# Patient Record
Sex: FEMALE | Race: WHITE | Employment: OTHER | URBAN - METROPOLITAN AREA
[De-identification: names, ages, dates, MRNs, and addresses within clinical notes are randomized per-mention and may not be internally consistent; named-entity substitution may affect disease eponyms.]

---

## 2017-01-25 ENCOUNTER — OFFICE VISIT (OUTPATIENT)
Dept: FAMILY MEDICINE CLINIC | Facility: CLINIC | Age: 72
End: 2017-01-25

## 2017-01-25 VITALS
BODY MASS INDEX: 33.13 KG/M2 | TEMPERATURE: 98 F | DIASTOLIC BLOOD PRESSURE: 80 MMHG | HEIGHT: 62 IN | SYSTOLIC BLOOD PRESSURE: 122 MMHG | HEART RATE: 80 BPM | OXYGEN SATURATION: 97 % | RESPIRATION RATE: 20 BRPM | WEIGHT: 180 LBS

## 2017-01-25 DIAGNOSIS — J01.10 ACUTE FRONTAL SINUSITIS, RECURRENCE NOT SPECIFIED: Primary | ICD-10-CM

## 2017-01-25 PROCEDURE — 99202 OFFICE O/P NEW SF 15 MIN: CPT | Performed by: NURSE PRACTITIONER

## 2017-01-25 RX ORDER — LEVOTHYROXINE SODIUM 88 UG/1
88 TABLET ORAL
COMMUNITY
End: 2017-02-18

## 2017-01-25 RX ORDER — FLUTICASONE PROPIONATE 50 MCG
2 SPRAY, SUSPENSION (ML) NASAL DAILY
Qty: 1 BOTTLE | Refills: 0 | Status: SHIPPED | OUTPATIENT
Start: 2017-01-25 | End: 2017-02-08

## 2017-01-25 RX ORDER — ENALAPRIL MALEATE 10 MG/1
10 TABLET ORAL DAILY
COMMUNITY

## 2017-01-25 RX ORDER — AMOXICILLIN AND CLAVULANATE POTASSIUM 875; 125 MG/1; MG/1
1 TABLET, FILM COATED ORAL 2 TIMES DAILY
Qty: 20 TABLET | Refills: 0 | Status: SHIPPED | OUTPATIENT
Start: 2017-01-25 | End: 2017-02-04

## 2017-01-25 NOTE — PROGRESS NOTES
CHIEF COMPLAINT:   Patient presents with:  Sinus Problem: s/s for 1 week. Post Nasal Drip: sore throat improving, lost of voice  Ear Pain: Left ear      HPI:   Jenny Reese is a 80year old female who presents for sinus congestion for  1  weeks.  Sym SKIN: no rashes,no suspicious lesions  HEAD: atraumatic, normocephalic,  + tenderness on palpation of frontal sinuses  EYES: conjunctiva clear, EOM intact  EARS: TM's clear gray, + bulging, no retraction, + fluid, bony landmarks visualized.    NOSE: nostril -   If increased coughing at night consider elevating head with pillows, or try tea with honey    -   Cepacol lozenges to sooth throat or cough drops      -   Humidified air, saline spray, or Neti pot     -   Take antibiotics with food.    -   Complete enti The sinuses are air-filled spaces within the bones of the face. They connect to the inside of the nose. Sinusitis is an inflammation of the tissue lining the sinus cavity. Sinus inflammation can occur during a cold.  It can also be due to allergies to polle · Do not use nasal rinses or irrigation during an acute sinus infection, unless told to by your health care provider. Rinsing may spread the infection to other sinuses.   · Use acetaminophen or ibuprofen to control pain, unless another pain medicine was pre

## 2017-02-18 ENCOUNTER — OFFICE VISIT (OUTPATIENT)
Dept: FAMILY MEDICINE CLINIC | Facility: CLINIC | Age: 72
End: 2017-02-18

## 2017-02-18 VITALS
SYSTOLIC BLOOD PRESSURE: 124 MMHG | RESPIRATION RATE: 20 BRPM | HEIGHT: 63 IN | OXYGEN SATURATION: 97 % | TEMPERATURE: 98 F | WEIGHT: 180 LBS | DIASTOLIC BLOOD PRESSURE: 80 MMHG | HEART RATE: 74 BPM | BODY MASS INDEX: 31.89 KG/M2

## 2017-02-18 DIAGNOSIS — H02.89 EYELID PAIN, RIGHT: ICD-10-CM

## 2017-02-18 DIAGNOSIS — H10.31 ACUTE CONJUNCTIVITIS OF RIGHT EYE, UNSPECIFIED ACUTE CONJUNCTIVITIS TYPE: Primary | ICD-10-CM

## 2017-02-18 PROCEDURE — 99213 OFFICE O/P EST LOW 20 MIN: CPT | Performed by: NURSE PRACTITIONER

## 2017-02-18 RX ORDER — POLYMYXIN B SULFATE AND TRIMETHOPRIM 1; 10000 MG/ML; [USP'U]/ML
2 SOLUTION OPHTHALMIC EVERY 6 HOURS
Qty: 1 BOTTLE | Refills: 0 | Status: SHIPPED | OUTPATIENT
Start: 2017-02-18 | End: 2017-02-25

## 2017-02-18 RX ORDER — LEVOTHYROXINE SODIUM 88 UG/1
88 TABLET ORAL
COMMUNITY

## 2017-02-18 RX ORDER — CEPHALEXIN 500 MG/1
500 CAPSULE ORAL 2 TIMES DAILY
Qty: 14 CAPSULE | Refills: 0 | Status: SHIPPED | OUTPATIENT
Start: 2017-02-18 | End: 2017-02-25

## 2017-02-18 NOTE — PROGRESS NOTES
CHIEF COMPLAINT:   Patient presents with:  Redness: painful, drainage, right eye  Eyelid Swelling: upper eyelid since AM      HPI:   Anamika Delatorre is a 80year old female who presents with chief complaint of  Right eye irritation.  Symptoms began  2  days mildly erythematous, no visible discharge. No periorbital edema. Upper right lid has moderate edema, erythema and tenderness  HENT: atraumatic, normocephalic, ears and throat are clear  LUNGS: clear to auscultation bilaterally.    CARDIO: RRR without murm

## 2017-02-18 NOTE — PATIENT INSTRUCTIONS
Conjunctivitis Caused by Infection     Wash hands often to help prevent spreading infection. Infections are caused by viruses or germs (bacteria). Treatment includes keeping your eyes and hands clean.  Your healthcare provider may prescribe eye drops,

## 2018-02-19 ENCOUNTER — HOSPITAL ENCOUNTER (EMERGENCY)
Age: 73
Discharge: HOME | End: 2018-02-19
Attending: EMERGENCY MEDICINE
Payer: MEDICARE

## 2018-02-19 ENCOUNTER — APPOINTMENT (OUTPATIENT)
Dept: CT IMAGING | Age: 73
End: 2018-02-19
Attending: EMERGENCY MEDICINE
Payer: MEDICARE

## 2018-02-19 VITALS
DIASTOLIC BLOOD PRESSURE: 83 MMHG | HEIGHT: 63 IN | BODY MASS INDEX: 31.89 KG/M2 | WEIGHT: 180 LBS | RESPIRATION RATE: 14 BRPM | HEART RATE: 61 BPM | SYSTOLIC BLOOD PRESSURE: 159 MMHG | TEMPERATURE: 98 F | OXYGEN SATURATION: 93 %

## 2018-02-19 DIAGNOSIS — M54.50 ACUTE LEFT-SIDED LOW BACK PAIN WITHOUT SCIATICA: Primary | ICD-10-CM

## 2018-02-19 LAB
BILIRUB UR QL STRIP.AUTO: NEGATIVE
CLARITY UR REFRACT.AUTO: CLEAR
COLOR UR AUTO: YELLOW
GLUCOSE UR STRIP.AUTO-MCNC: NEGATIVE MG/DL
KETONES UR STRIP.AUTO-MCNC: NEGATIVE MG/DL
NITRITE UR QL STRIP.AUTO: NEGATIVE
PH UR STRIP.AUTO: 5.5 [PH] (ref 4.5–8)
PROT UR STRIP.AUTO-MCNC: NEGATIVE MG/DL
RBC UR QL AUTO: NEGATIVE
SP GR UR STRIP.AUTO: >=1.03 (ref 1–1.03)
UROBILINOGEN UR STRIP.AUTO-MCNC: 0.2 MG/DL

## 2018-02-19 PROCEDURE — 81015 MICROSCOPIC EXAM OF URINE: CPT | Performed by: EMERGENCY MEDICINE

## 2018-02-19 PROCEDURE — 81001 URINALYSIS AUTO W/SCOPE: CPT | Performed by: EMERGENCY MEDICINE

## 2018-02-19 PROCEDURE — 99284 EMERGENCY DEPT VISIT MOD MDM: CPT

## 2018-02-19 PROCEDURE — 87186 SC STD MICRODIL/AGAR DIL: CPT | Performed by: EMERGENCY MEDICINE

## 2018-02-19 PROCEDURE — 87077 CULTURE AEROBIC IDENTIFY: CPT | Performed by: EMERGENCY MEDICINE

## 2018-02-19 PROCEDURE — 87086 URINE CULTURE/COLONY COUNT: CPT | Performed by: EMERGENCY MEDICINE

## 2018-02-19 PROCEDURE — 74176 CT ABD & PELVIS W/O CONTRAST: CPT | Performed by: EMERGENCY MEDICINE

## 2018-02-19 RX ORDER — HYDROCODONE BITARTRATE AND ACETAMINOPHEN 5; 325 MG/1; MG/1
1 TABLET ORAL ONCE
Status: COMPLETED | OUTPATIENT
Start: 2018-02-19 | End: 2018-02-19

## 2018-02-19 RX ORDER — HYDROCODONE BITARTRATE AND ACETAMINOPHEN 5; 325 MG/1; MG/1
1-2 TABLET ORAL EVERY 4 HOURS PRN
Qty: 20 TABLET | Refills: 0 | Status: SHIPPED | OUTPATIENT
Start: 2018-02-19 | End: 2018-02-26

## 2018-02-19 RX ORDER — DIAZEPAM 5 MG/1
5 TABLET ORAL ONCE
Status: COMPLETED | OUTPATIENT
Start: 2018-02-19 | End: 2018-02-19

## 2018-02-19 RX ORDER — DIAZEPAM 5 MG/1
5 TABLET ORAL 3 TIMES DAILY PRN
Qty: 20 TABLET | Refills: 0 | Status: SHIPPED | OUTPATIENT
Start: 2018-02-19 | End: 2018-02-26

## 2018-02-19 NOTE — ED PROVIDER NOTES
Patient Seen in: [facility] [place]    History   Patient presents with:  Abdomen/Flank Pain (GI/)    Stated Complaint: Left flank pain    HPI    45-year-old female with a history of hypertension, hypothyroidism presents to the [facility] URINALYSIS WITH CULTURE REFLEX - Abnormal; Notable for the following:        Result Value    Leukocyte Esterase Urine Trace (*)     All other components within normal limits   URINE MICROSCOPIC W REFLEX CULTURE - Abnormal; Notable for the following: Clinical Impression:  Acute left-sided low back pain without sciatica  (primary encounter diagnosis)    Disposition:  Discharge  2/19/2018  1:49 am    Follow-up:  Terry Saldaña MD  [address] [place] [id number]  [phone]    Schedule an ap

## 2018-02-21 RX ORDER — CIPROFLOXACIN 500 MG/1
500 TABLET, FILM COATED ORAL 2 TIMES DAILY
Qty: 20 TABLET | Refills: 0 | Status: SHIPPED | OUTPATIENT
Start: 2018-02-21 | End: 2018-03-03

## 2018-02-21 NOTE — ED NOTES
PT NOTIFIED OF NEED FOR ABX DUE TO UTI. PT UPSET THAT IT TOOK SO LONG TO LET HER KNOW. PT STATES SHE HAS DR APPOINTMENT TODAY.   RX CALLED INTO [facility] [phone] PER PT REQUEST

## 2019-05-18 ENCOUNTER — OFFICE VISIT (OUTPATIENT)
Dept: FAMILY MEDICINE CLINIC | Facility: CLINIC | Age: 74
End: 2019-05-18
Payer: MEDICARE

## 2019-05-18 VITALS
HEIGHT: 63 IN | BODY MASS INDEX: 33.66 KG/M2 | RESPIRATION RATE: 17 BRPM | DIASTOLIC BLOOD PRESSURE: 72 MMHG | HEART RATE: 69 BPM | OXYGEN SATURATION: 98 % | WEIGHT: 190 LBS | SYSTOLIC BLOOD PRESSURE: 124 MMHG | TEMPERATURE: 98 F

## 2019-05-18 DIAGNOSIS — L25.9 CONTACT DERMATITIS, UNSPECIFIED CONTACT DERMATITIS TYPE, UNSPECIFIED TRIGGER: Primary | ICD-10-CM

## 2019-05-18 PROCEDURE — 99213 OFFICE O/P EST LOW 20 MIN: CPT | Performed by: PHYSICIAN ASSISTANT

## 2019-05-18 RX ORDER — PREDNISONE 10 MG/1
TABLET ORAL
Qty: 24 TABLET | Refills: 0 | Status: SHIPPED | OUTPATIENT
Start: 2019-05-18 | End: 2021-10-01

## 2019-05-18 NOTE — PROGRESS NOTES
CHIEF COMPLAINT:   Patient presents with:  Rash: was trimming on wed. and noticed rash on chest , abd, and hand . itchy , benadryl          HPI:    Chip Garsia is a 68year old female who presents for evaluation of a rash. Rash started yesterday.  Rash atraumatic, normocephalic. LUNGS: Clear to auscultation bilaterally. No wheezing, rhonchi, or rales. No diminished breath sounds. No increased work of breathing. CARDIO: RRR without murmur  LYMPH: No  lymphadenopathy.      ASSESSMENT AND PLAN:   Randi Weston

## 2019-07-31 ENCOUNTER — OFFICE VISIT (OUTPATIENT)
Dept: FAMILY MEDICINE CLINIC | Facility: CLINIC | Age: 74
End: 2019-07-31
Payer: MEDICARE

## 2019-07-31 VITALS
RESPIRATION RATE: 16 BRPM | SYSTOLIC BLOOD PRESSURE: 136 MMHG | OXYGEN SATURATION: 97 % | TEMPERATURE: 98 F | WEIGHT: 189 LBS | HEIGHT: 63 IN | HEART RATE: 75 BPM | BODY MASS INDEX: 33.49 KG/M2 | DIASTOLIC BLOOD PRESSURE: 80 MMHG

## 2019-07-31 DIAGNOSIS — R05.9 COUGH: ICD-10-CM

## 2019-07-31 DIAGNOSIS — J01.10 ACUTE NON-RECURRENT FRONTAL SINUSITIS: Primary | ICD-10-CM

## 2019-07-31 PROCEDURE — 99213 OFFICE O/P EST LOW 20 MIN: CPT | Performed by: PHYSICIAN ASSISTANT

## 2019-07-31 RX ORDER — CEFDINIR 300 MG/1
300 CAPSULE ORAL 2 TIMES DAILY
Qty: 20 CAPSULE | Refills: 0 | Status: SHIPPED | OUTPATIENT
Start: 2019-07-31 | End: 2019-08-10

## 2019-07-31 RX ORDER — BENZONATATE 200 MG/1
200 CAPSULE ORAL 3 TIMES DAILY PRN
Qty: 30 CAPSULE | Refills: 0 | Status: SHIPPED | OUTPATIENT
Start: 2019-07-31 | End: 2019-08-10

## 2019-07-31 NOTE — PATIENT INSTRUCTIONS
Continue Zyrtec and Flonase   Rest   Fluids   Cough medication as needed   Please follow up with PCP if no improvement or if symptoms worsen

## 2019-07-31 NOTE — PROGRESS NOTES
CHIEF COMPLAINT:   Patient presents with:  Cough: congestion x 8 days, chills, body aches      HPI:   Betty Denson is a 68year old female who presents for cold symptoms for 8 days. +runny nose. +draiange into the throat. +sinus pressure.  +left ear pres normocephalic, + tenderness on palpation of frontal sinuses  EYES: conjunctiva clear  EARS: TM's pearly grey, no bulging, no retraction, no fluid, bony landmarks present  NOSE: nostrils patent, + nasal mucous, nasal mucosa reddened   THROAT: oral mucosa pi

## 2021-03-15 DIAGNOSIS — Z23 NEED FOR VACCINATION: ICD-10-CM

## 2021-06-28 ENCOUNTER — OFFICE VISIT (OUTPATIENT)
Dept: FAMILY MEDICINE CLINIC | Facility: CLINIC | Age: 76
End: 2021-06-28
Payer: MEDICARE

## 2021-06-28 VITALS — HEIGHT: 63 IN | BODY MASS INDEX: 33.66 KG/M2 | WEIGHT: 190 LBS

## 2021-06-28 DIAGNOSIS — Z02.9 ADMINISTRATIVE ENCOUNTER: Primary | ICD-10-CM

## 2021-06-28 NOTE — PROGRESS NOTES
Pt presented with poison ivy after cutting some of her bushes. States she was covered except for her neck which is where the rash is.  + itching X 4 days. Patient wanted to avoid steroids due to them aggravating her GERD.   Discussed possible OTC options

## 2021-08-02 ENCOUNTER — WALK IN (OUTPATIENT)
Dept: URGENT CARE | Age: 76
End: 2021-08-02

## 2021-08-02 VITALS
BODY MASS INDEX: 33.66 KG/M2 | TEMPERATURE: 99.5 F | HEIGHT: 63 IN | SYSTOLIC BLOOD PRESSURE: 130 MMHG | HEART RATE: 68 BPM | DIASTOLIC BLOOD PRESSURE: 70 MMHG | RESPIRATION RATE: 20 BRPM | WEIGHT: 190 LBS

## 2021-08-02 DIAGNOSIS — T24.101A SUPERFICIAL BURN OF RIGHT LOWER EXTREMITY, INITIAL ENCOUNTER: Primary | ICD-10-CM

## 2021-08-02 PROCEDURE — 99202 OFFICE O/P NEW SF 15 MIN: CPT | Performed by: NURSE PRACTITIONER

## 2021-08-02 RX ORDER — LATANOPROST 50 UG/ML
SOLUTION/ DROPS OPHTHALMIC
COMMUNITY
Start: 2021-07-07

## 2021-08-02 RX ORDER — FAMOTIDINE 20 MG/1
20 TABLET, FILM COATED ORAL NIGHTLY
COMMUNITY
Start: 2021-04-29

## 2021-08-02 RX ORDER — ENALAPRIL MALEATE 10 MG/1
10 TABLET ORAL DAILY
COMMUNITY
Start: 2021-07-06

## 2021-08-02 RX ORDER — LEVOTHYROXINE SODIUM 88 MCG
TABLET ORAL
COMMUNITY
Start: 2021-07-06

## 2021-10-01 ENCOUNTER — OFFICE VISIT (OUTPATIENT)
Dept: FAMILY MEDICINE CLINIC | Facility: CLINIC | Age: 76
End: 2021-10-01
Payer: MEDICARE

## 2021-10-01 VITALS
BODY MASS INDEX: 33.66 KG/M2 | DIASTOLIC BLOOD PRESSURE: 74 MMHG | HEART RATE: 78 BPM | WEIGHT: 190 LBS | HEIGHT: 63 IN | RESPIRATION RATE: 18 BRPM | OXYGEN SATURATION: 96 % | SYSTOLIC BLOOD PRESSURE: 138 MMHG | TEMPERATURE: 99 F

## 2021-10-01 DIAGNOSIS — N30.00 ACUTE CYSTITIS WITHOUT HEMATURIA: ICD-10-CM

## 2021-10-01 DIAGNOSIS — R39.9 UTI SYMPTOMS: Primary | ICD-10-CM

## 2021-10-01 PROCEDURE — 81003 URINALYSIS AUTO W/O SCOPE: CPT | Performed by: NURSE PRACTITIONER

## 2021-10-01 PROCEDURE — 99213 OFFICE O/P EST LOW 20 MIN: CPT | Performed by: NURSE PRACTITIONER

## 2021-10-01 PROCEDURE — 87086 URINE CULTURE/COLONY COUNT: CPT | Performed by: NURSE PRACTITIONER

## 2021-10-01 RX ORDER — NITROFURANTOIN 25; 75 MG/1; MG/1
100 CAPSULE ORAL 2 TIMES DAILY
Qty: 10 CAPSULE | Refills: 0 | Status: SHIPPED | OUTPATIENT
Start: 2021-10-01 | End: 2021-10-06

## 2021-10-01 NOTE — PATIENT INSTRUCTIONS
Medication as prescribed. Push fluids. We will call you with urine culture results. Bladder Infection, Female (Adult)     Urine normally doesn't have any germs (bacteria) in it.  But bacteria can get into the urinary tract from the skin around the infection. This often happens because of:  · Wiping incorrectly after urinating. Always wipe from front to back. · Bowel incontinence  · Pregnancy  · Procedures such as having a catheter put in  · Older age  · Not emptying your bladder.  This can give bact constipation. Eat more fresh fruits and vegetables, and fiber. Eat less junk foods and fatty foods. · Don't have sex until your symptoms are gone. · Don't have caffeine, alcohol, and spicy foods. These can irritate your bladder.   · Urinate right after yo

## 2021-10-01 NOTE — PROGRESS NOTES
CHIEF COMPLAINT:   Patient presents with:  UTI      HPI:   Kathe Menezes is a 77year old female who presents with symptoms of UTI. Complaining of urinary frequency, urgency, bladder pressure for last 7 days. Symptoms have been worsening since onset. Urine Negative Negative mg/dL    Bilirubin Urine Small (A) Negative    Ketones, UA Trace Negative - Trace mg/dL    Spec Gravity >1.030 1.005 - 1.030    Blood Urine Trace-intact (A) Negative    PH Urine 5.0 5.0 - 8.0    Protein Urine Trace Negative - Trace spreads to the kidney. The terms bladder infection, UTI, and cystitis are often used to describe the same thing. But they are not always the same. Cystitis is an inflammation of the bladder. The most common cause of cystitis is an infection.   Symptoms  Th up, even if you feel better. It's important to finish them to make sure the infection has cleared. · You can use acetaminophen or ibuprofen for pain, fever, or discomfort, unless another medicine was prescribed.  If you have long-term (chronic) liver or ki done, you will be told if the results will affect your treatment.   Call 911  Call 911 if any of the following occur:  · Trouble breathing  · Hard to wake up or confusion  · Fainting (loss of consciousness)  · Fast heart rate  When to get medical advice  Ca

## 2022-03-04 ENCOUNTER — OFFICE VISIT (OUTPATIENT)
Dept: SURGERY | Facility: CLINIC | Age: 77
End: 2022-03-04
Payer: MEDICARE

## 2022-03-04 VITALS
DIASTOLIC BLOOD PRESSURE: 90 MMHG | OXYGEN SATURATION: 97 % | BODY MASS INDEX: 35.9 KG/M2 | TEMPERATURE: 97 F | WEIGHT: 202.63 LBS | RESPIRATION RATE: 16 BRPM | HEIGHT: 63 IN | HEART RATE: 75 BPM | SYSTOLIC BLOOD PRESSURE: 166 MMHG

## 2022-03-04 DIAGNOSIS — N64.52 DISCHARGE FROM LEFT NIPPLE: Primary | ICD-10-CM

## 2022-03-04 PROCEDURE — 99204 OFFICE O/P NEW MOD 45 MIN: CPT | Performed by: SURGERY

## 2022-03-25 ENCOUNTER — HOSPITAL ENCOUNTER (OUTPATIENT)
Dept: MAMMOGRAPHY | Facility: HOSPITAL | Age: 77
Discharge: HOME | End: 2022-03-25
Attending: SURGERY
Payer: MEDICARE

## 2022-03-25 DIAGNOSIS — N64.52 DISCHARGE FROM LEFT NIPPLE: ICD-10-CM

## 2022-03-25 PROCEDURE — 76642 ULTRASOUND BREAST LIMITED: CPT | Performed by: SURGERY

## 2022-03-29 ENCOUNTER — TELEPHONE (OUTPATIENT)
Dept: SURGERY | Facility: CLINIC | Age: 77
End: 2022-03-29

## 2022-03-29 NOTE — TELEPHONE ENCOUNTER
Called and spoke with patient about her breast US results. Per Dr. Linnea Renteria, \"Though there is nothing suspicious on the ultrasound, if she continues to have the discharge I recommend a terminal duct excision to be certain we know the pathological cause\"      Pt stated that she does not want to have surgery at this time. Told her I will relay this to Dr. Linnea Renteria and give her a call back for when to follow up in the office. Encouraged pt to call the office with any further questions.

## 2022-04-05 ENCOUNTER — TELEPHONE (OUTPATIENT)
Dept: SURGERY | Facility: CLINIC | Age: 77
End: 2022-04-05

## 2022-04-05 NOTE — TELEPHONE ENCOUNTER
Called and spoke with pt. Informed patient that since she does not want to have surgery, Dr. Clara Beltrán would like for her to have an MRI of her breasts. Pt agreeable to MRI. Order placed. Phone number provided to pt for central scheduling to call and have the MRI scheduled.

## 2022-04-17 ENCOUNTER — HOSPITAL ENCOUNTER (OUTPATIENT)
Dept: MRI IMAGING | Facility: HOSPITAL | Age: 77
Discharge: HOME | End: 2022-04-17
Payer: MEDICARE

## 2022-04-17 ENCOUNTER — HOSPITAL ENCOUNTER (OUTPATIENT)
Dept: MRI IMAGING | Facility: HOSPITAL | Age: 77
End: 2022-04-17
Payer: MEDICARE

## 2022-04-17 DIAGNOSIS — N64.52 DISCHARGE FROM LEFT NIPPLE: ICD-10-CM

## 2022-04-17 PROCEDURE — A9575 INJ GADOTERATE MEGLUMI 0.1ML: HCPCS

## 2022-04-17 PROCEDURE — 77049 MRI BREAST C-+ W/CAD BI: CPT

## 2022-04-18 ENCOUNTER — TELEPHONE (OUTPATIENT)
Dept: SURGERY | Facility: CLINIC | Age: 77
End: 2022-04-18

## 2022-04-19 ENCOUNTER — TELEPHONE (OUTPATIENT)
Dept: MAMMOGRAPHY | Facility: HOSPITAL | Age: 77
End: 2022-04-19

## 2022-04-29 ENCOUNTER — HOSPITAL ENCOUNTER (OUTPATIENT)
Dept: MAMMOGRAPHY | Facility: HOSPITAL | Age: 77
Discharge: HOME | End: 2022-04-29
Payer: MEDICARE

## 2022-04-29 ENCOUNTER — HOSPITAL ENCOUNTER (OUTPATIENT)
Dept: MRI IMAGING | Facility: HOSPITAL | Age: 77
Discharge: HOME | End: 2022-04-29
Payer: MEDICARE

## 2022-04-29 DIAGNOSIS — R92.8 ABNORMAL MAMMOGRAM: ICD-10-CM

## 2022-04-29 DIAGNOSIS — N64.52 DISCHARGE FROM LEFT NIPPLE: ICD-10-CM

## 2022-04-29 DIAGNOSIS — R92.8 ABNORMAL MRI, BREAST: ICD-10-CM

## 2022-04-29 PROCEDURE — 88305 TISSUE EXAM BY PATHOLOGIST: CPT

## 2022-04-29 PROCEDURE — A9575 INJ GADOTERATE MEGLUMI 0.1ML: HCPCS

## 2022-04-29 PROCEDURE — 19085 BX BREAST 1ST LESION MR IMAG: CPT

## 2022-04-29 PROCEDURE — 77065 DX MAMMO INCL CAD UNI: CPT

## 2022-04-29 NOTE — IMAGING NOTE
Pt arrived with daughter Reuben Erickson. Procedure explained throughout and all questions answered. Consent and discharge paperwork signed by Lee Ann Trammell. Patient placed prone in comfortable position on MRI table. Left  breast positioned in compression. Assisted Dr. Patrice Lindsey with MRI guided biopsy. Patient tolerated well. Pressure held on biopsy site for 15 minutes. Steri strips applied . No  bleeding noted with no hematoma.  Patient transported to [facility] via [facility] for post Breast mammogram.

## 2022-05-03 ENCOUNTER — TELEPHONE (OUTPATIENT)
Dept: SURGERY | Facility: CLINIC | Age: 77
End: 2022-05-03

## 2022-05-03 NOTE — TELEPHONE ENCOUNTER
Called pt regarding Mri results per . Pt would like to set a appt for more details regarding Mri. I will have PSRs call pt to set an appt. Pt verbally understand.

## 2022-05-06 ENCOUNTER — OFFICE VISIT (OUTPATIENT)
Dept: SURGERY | Facility: CLINIC | Age: 77
End: 2022-05-06
Payer: MEDICARE

## 2022-05-06 VITALS
SYSTOLIC BLOOD PRESSURE: 144 MMHG | RESPIRATION RATE: 20 BRPM | BODY MASS INDEX: 37 KG/M2 | OXYGEN SATURATION: 95 % | HEART RATE: 60 BPM | WEIGHT: 207.81 LBS | DIASTOLIC BLOOD PRESSURE: 86 MMHG | TEMPERATURE: 97 F

## 2022-05-06 DIAGNOSIS — D36.9 INTRADUCTAL PAPILLOMA: Primary | ICD-10-CM

## 2022-05-06 PROCEDURE — 99215 OFFICE O/P EST HI 40 MIN: CPT | Performed by: SURGERY

## 2022-05-06 NOTE — PATIENT INSTRUCTIONS
Dr. Ravi Kc  Tel: [phone]  Fax: [facility] [place], [address]  [phone]     Surgery/Procedure: Left breast arden seed excisional biopsy, left breast terminal duct excision. Anesthesia:   MAC  Surgery Length:   45 minutes CPT:  91381, 12465   Wire LOC:   NO Nuc Med:   NO Arden Seed:  YES     Dx & ICD-10: Intraductal papilloma (D36.9)   Radiology Instructions:  Left breast, 12 o'clock retroaereolar position, coil shaped clip, biopsy confirms intraductal papilloma.   _______________________________________________________________________________    1. Someone must accompany you the day of the procedure to drive you home safely, because of anesthesia. 2. You must remove any kind of makeup, nail polish, lotions, powders, creams or deodorant. 3. [place] ONLY: Pre-admission will give instruct you on when to take Gatorade and Tylenol/acetaminophen prior to your surgery, purchase 2 - 12oz bottles of regular Gatorade (NOT RED/SUGAR FREE). Otherwise, you may not eat or drink anything else after 11PM the night before surgery. 4. [place] ONLY: You may not eat or drink anything after midnight the day of your surgery. 5. Wear comfortable clothing that can be easily removed. 6. If you wear dentures, contacts lenses, or any prosthesis, you will be asked to remove them. 7. Do not drink alcohol or smoke 24 hours prior to your procedure. 8. Bring a picture ID and your insurance card. 9. You will be contacted by the hospital for Pre-Admission Covid-19 testing (regardless of vaccination status) to be scheduled as an appointment prior to surgery. They will call closer to the surgery date to set this up, because the earliest this can be done is 72 hours prior to surgery. 10. The Pre-Admission Testing Department will call the day before to confirm your procedure, give you the time you need to arrive by and directions on where to go.  They begin making calls after 2pm, if you are not contacted by 4pm, please call the surgeon's office listed above. 11. Do not take any blood thinners at least one week prior to the procedure/surgery. This includes aspirin, baby aspirin, Ibuprofen products, herbal supplements, diet medications, vitamin E, fish oil and green tea supplements. Please check other supplements for these ingredients. *TYLENOL or acetaminophen is acceptable*  12. If you take Coumadin, Plavix, Xarelto, or Eliquis, please contact your prescribing physician for special instructions on how long to hold. If you take insulin contact your primary care physician for special instructions. 15. Our surgery scheduler, Eder Swartz, will be contacting you to discuss surgery dates. If you have any questions related to scheduling your surgery, please reach out to her at [phone].  _____________________________________________________________________  PRE-OPERATIVE TESTING IF INDICATED BELOW  PLEASE COMPLETE ASAP (AT LEAST 7-10 DAYS PRIOR TO SURGERY)  [] CBC [x] BMP [] CMP [x] EKG    [] PT, PTT, INR [] Cardiac Clearance  [x] H&P Medical Clearance [] Chest X-ray     Please call Central Scheduling to schedule an appointment for pre-operative labs/tests @ [phone]    Does the patient have a pacemaker or ICD?      [] Yes   [x] No

## 2022-05-10 ENCOUNTER — TELEPHONE (OUTPATIENT)
Dept: SURGERY | Facility: CLINIC | Age: 77
End: 2022-05-10

## 2022-05-10 NOTE — TELEPHONE ENCOUNTER
Calling pt in regards to scheduling surgery. Informed pt that I have 06/17 patient requested 06/30 when daughter returns from trip at [facility] with Dr. Ulises Gaming. Pt verbalized understanding and in agreement with date and location. All questions answered. Encouraged pt to call or MyChart message office with any other questions or concerns.

## 2022-05-11 ENCOUNTER — TELEPHONE (OUTPATIENT)
Dept: MAMMOGRAPHY | Facility: HOSPITAL | Age: 77
End: 2022-05-11

## 2022-05-11 NOTE — TELEPHONE ENCOUNTER
Phoned Shruti Adams regarding [facility] localization process of breast for lumpectomy scheduled for 6-30-22 with Dr. Erma Klein. Procedure explained and all questions answered. Pt verbalized understanding and had no further questions at this time. Patient denies blood thinners, bleeding disorders, and liver disease.

## 2022-06-15 ENCOUNTER — HOSPITAL ENCOUNTER (OUTPATIENT)
Dept: MAMMOGRAPHY | Facility: HOSPITAL | Age: 77
Discharge: HOME | End: 2022-06-15
Attending: SURGERY
Payer: MEDICARE

## 2022-06-15 DIAGNOSIS — D36.9 INTRADUCTAL PAPILLOMA: ICD-10-CM

## 2022-06-15 PROCEDURE — 19285 PERQ DEV BREAST 1ST US IMAG: CPT | Performed by: SURGERY

## 2022-06-15 PROCEDURE — 77065 DX MAMMO INCL CAD UNI: CPT | Performed by: SURGERY

## 2022-06-15 NOTE — IMAGING NOTE
Assisted Dr. Bertha Graff with [facility] localization of left breast for lumpectomy scheduled for 6-30-22. Procedure explained and all questions answered. Pt verbalized understanding. Emotional support provided and pt tolerated procedure well with minimal discomfort. Band Aid applied to site. Patient discharged in stable condition to home.

## 2022-06-16 ENCOUNTER — TELEPHONE (OUTPATIENT)
Dept: SURGERY | Facility: CLINIC | Age: 77
End: 2022-06-16

## 2022-06-16 NOTE — TELEPHONE ENCOUNTER
Fax received with patient's pre-op medical clearance, EKG, and labs. Will send via fax to [employer].

## 2022-06-27 ENCOUNTER — LAB ENCOUNTER (OUTPATIENT)
Dept: LAB | Age: 77
End: 2022-06-27
Attending: SURGERY
Payer: MEDICARE

## 2022-06-27 DIAGNOSIS — Z20.822 ENCOUNTER FOR PREPROCEDURE SCREENING LABORATORY TESTING FOR COVID-19: ICD-10-CM

## 2022-06-27 DIAGNOSIS — Z01.812 ENCOUNTER FOR PREPROCEDURE SCREENING LABORATORY TESTING FOR COVID-19: ICD-10-CM

## 2022-06-27 RX ORDER — LEVOTHYROXINE SODIUM 88 UG/1
88 TABLET ORAL
COMMUNITY

## 2022-06-28 LAB — SARS-COV-2 RNA RESP QL NAA+PROBE: NOT DETECTED

## 2022-06-30 ENCOUNTER — HOSPITAL ENCOUNTER (OUTPATIENT)
Facility: HOSPITAL | Age: 77
Setting detail: HOSPITAL OUTPATIENT SURGERY
Discharge: HOME | End: 2022-06-30
Attending: SURGERY | Admitting: SURGERY
Payer: MEDICARE

## 2022-06-30 ENCOUNTER — ANESTHESIA EVENT (OUTPATIENT)
Dept: SURGERY | Facility: HOSPITAL | Age: 77
End: 2022-06-30
Payer: MEDICARE

## 2022-06-30 ENCOUNTER — HOSPITAL ENCOUNTER (OUTPATIENT)
Dept: MAMMOGRAPHY | Facility: HOSPITAL | Age: 77
Discharge: HOME | End: 2022-06-30
Attending: SURGERY
Payer: MEDICARE

## 2022-06-30 ENCOUNTER — ANESTHESIA (OUTPATIENT)
Dept: SURGERY | Facility: HOSPITAL | Age: 77
End: 2022-06-30
Payer: MEDICARE

## 2022-06-30 VITALS
SYSTOLIC BLOOD PRESSURE: 135 MMHG | RESPIRATION RATE: 18 BRPM | WEIGHT: 205.25 LBS | HEIGHT: 62.5 IN | DIASTOLIC BLOOD PRESSURE: 75 MMHG | OXYGEN SATURATION: 94 % | BODY MASS INDEX: 36.83 KG/M2 | HEART RATE: 64 BPM | TEMPERATURE: 98 F

## 2022-06-30 DIAGNOSIS — D36.9 INTRADUCTAL PAPILLOMA: ICD-10-CM

## 2022-06-30 DIAGNOSIS — Z20.822 ENCOUNTER FOR PREPROCEDURE SCREENING LABORATORY TESTING FOR COVID-19: Primary | ICD-10-CM

## 2022-06-30 DIAGNOSIS — Z01.812 ENCOUNTER FOR PREPROCEDURE SCREENING LABORATORY TESTING FOR COVID-19: Primary | ICD-10-CM

## 2022-06-30 PROCEDURE — S0028 INJECTION, FAMOTIDINE, 20 MG: HCPCS | Performed by: STUDENT IN AN ORGANIZED HEALTH CARE EDUCATION/TRAINING PROGRAM

## 2022-06-30 PROCEDURE — 88344 IMHCHEM/IMCYTCHM EA MLT ANTB: CPT | Performed by: SURGERY

## 2022-06-30 PROCEDURE — 88307 TISSUE EXAM BY PATHOLOGIST: CPT | Performed by: SURGERY

## 2022-06-30 PROCEDURE — 0HBU0ZX EXCISION OF LEFT BREAST, OPEN APPROACH, DIAGNOSTIC: ICD-10-PCS | Performed by: SURGERY

## 2022-06-30 PROCEDURE — 76098 X-RAY EXAM SURGICAL SPECIMEN: CPT | Performed by: SURGERY

## 2022-06-30 PROCEDURE — 88342 IMHCHEM/IMCYTCHM 1ST ANTB: CPT | Performed by: SURGERY

## 2022-06-30 PROCEDURE — 88341 IMHCHEM/IMCYTCHM EA ADD ANTB: CPT | Performed by: SURGERY

## 2022-06-30 RX ORDER — HYDROCODONE BITARTRATE AND ACETAMINOPHEN 5; 325 MG/1; MG/1
1 TABLET ORAL ONCE AS NEEDED
Status: DISCONTINUED | OUTPATIENT
Start: 2022-06-30 | End: 2022-06-30

## 2022-06-30 RX ORDER — INSULIN ASPART 100 [IU]/ML
INJECTION, SOLUTION INTRAVENOUS; SUBCUTANEOUS ONCE
Status: DISCONTINUED | OUTPATIENT
Start: 2022-06-30 | End: 2022-06-30

## 2022-06-30 RX ORDER — CEFAZOLIN SODIUM/WATER 2 G/20 ML
2 SYRINGE (ML) INTRAVENOUS ONCE
Status: COMPLETED | OUTPATIENT
Start: 2022-06-30 | End: 2022-06-30

## 2022-06-30 RX ORDER — SODIUM CHLORIDE, SODIUM LACTATE, POTASSIUM CHLORIDE, CALCIUM CHLORIDE 600; 310; 30; 20 MG/100ML; MG/100ML; MG/100ML; MG/100ML
INJECTION, SOLUTION INTRAVENOUS CONTINUOUS
Status: DISCONTINUED | OUTPATIENT
Start: 2022-06-30 | End: 2022-06-30

## 2022-06-30 RX ORDER — BUPIVACAINE HYDROCHLORIDE 5 MG/ML
INJECTION, SOLUTION EPIDURAL; INTRACAUDAL AS NEEDED
Status: DISCONTINUED | OUTPATIENT
Start: 2022-06-30 | End: 2022-06-30 | Stop reason: HOSPADM

## 2022-06-30 RX ORDER — NALOXONE HYDROCHLORIDE 0.4 MG/ML
80 INJECTION, SOLUTION INTRAMUSCULAR; INTRAVENOUS; SUBCUTANEOUS AS NEEDED
Status: DISCONTINUED | OUTPATIENT
Start: 2022-06-30 | End: 2022-06-30

## 2022-06-30 RX ORDER — ACETAMINOPHEN 500 MG
1000 TABLET ORAL ONCE AS NEEDED
Status: DISCONTINUED | OUTPATIENT
Start: 2022-06-30 | End: 2022-06-30

## 2022-06-30 RX ORDER — HYDROMORPHONE HYDROCHLORIDE 1 MG/ML
0.2 INJECTION, SOLUTION INTRAMUSCULAR; INTRAVENOUS; SUBCUTANEOUS EVERY 5 MIN PRN
Status: DISCONTINUED | OUTPATIENT
Start: 2022-06-30 | End: 2022-06-30

## 2022-06-30 RX ORDER — METOPROLOL TARTRATE 5 MG/5ML
2.5 INJECTION INTRAVENOUS ONCE
Status: DISCONTINUED | OUTPATIENT
Start: 2022-06-30 | End: 2022-06-30

## 2022-06-30 RX ORDER — HYDROMORPHONE HYDROCHLORIDE 1 MG/ML
0.4 INJECTION, SOLUTION INTRAMUSCULAR; INTRAVENOUS; SUBCUTANEOUS EVERY 5 MIN PRN
Status: DISCONTINUED | OUTPATIENT
Start: 2022-06-30 | End: 2022-06-30

## 2022-06-30 RX ORDER — FAMOTIDINE 10 MG/ML
INJECTION, SOLUTION INTRAVENOUS AS NEEDED
Status: DISCONTINUED | OUTPATIENT
Start: 2022-06-30 | End: 2022-06-30 | Stop reason: SURG

## 2022-06-30 RX ORDER — HYDROMORPHONE HYDROCHLORIDE 1 MG/ML
0.6 INJECTION, SOLUTION INTRAMUSCULAR; INTRAVENOUS; SUBCUTANEOUS EVERY 5 MIN PRN
Status: DISCONTINUED | OUTPATIENT
Start: 2022-06-30 | End: 2022-06-30

## 2022-06-30 RX ORDER — HYDROCODONE BITARTRATE AND ACETAMINOPHEN 5; 325 MG/1; MG/1
2 TABLET ORAL ONCE AS NEEDED
Status: DISCONTINUED | OUTPATIENT
Start: 2022-06-30 | End: 2022-06-30

## 2022-06-30 RX ORDER — ONDANSETRON 2 MG/ML
4 INJECTION INTRAMUSCULAR; INTRAVENOUS EVERY 6 HOURS PRN
Status: DISCONTINUED | OUTPATIENT
Start: 2022-06-30 | End: 2022-06-30

## 2022-06-30 RX ORDER — LIDOCAINE HYDROCHLORIDE AND EPINEPHRINE 10; 10 MG/ML; UG/ML
INJECTION, SOLUTION INFILTRATION; PERINEURAL AS NEEDED
Status: DISCONTINUED | OUTPATIENT
Start: 2022-06-30 | End: 2022-06-30 | Stop reason: HOSPADM

## 2022-06-30 RX ORDER — ACETAMINOPHEN 500 MG
1000 TABLET ORAL ONCE
Status: DISCONTINUED | OUTPATIENT
Start: 2022-06-30 | End: 2022-06-30 | Stop reason: HOSPADM

## 2022-06-30 RX ORDER — ONDANSETRON 2 MG/ML
INJECTION INTRAMUSCULAR; INTRAVENOUS AS NEEDED
Status: DISCONTINUED | OUTPATIENT
Start: 2022-06-30 | End: 2022-06-30 | Stop reason: SURG

## 2022-06-30 RX ORDER — DEXAMETHASONE SODIUM PHOSPHATE 4 MG/ML
VIAL (ML) INJECTION AS NEEDED
Status: DISCONTINUED | OUTPATIENT
Start: 2022-06-30 | End: 2022-06-30 | Stop reason: SURG

## 2022-06-30 RX ORDER — METOCLOPRAMIDE HYDROCHLORIDE 5 MG/ML
10 INJECTION INTRAMUSCULAR; INTRAVENOUS EVERY 8 HOURS PRN
Status: DISCONTINUED | OUTPATIENT
Start: 2022-06-30 | End: 2022-06-30

## 2022-06-30 RX ORDER — HYDROCODONE BITARTRATE AND ACETAMINOPHEN 5; 325 MG/1; MG/1
1-2 TABLET ORAL EVERY 6 HOURS PRN
Qty: 20 TABLET | Refills: 0 | Status: SHIPPED | OUTPATIENT
Start: 2022-06-30 | End: 2022-07-07 | Stop reason: ALTCHOICE

## 2022-06-30 RX ADMIN — ONDANSETRON 4 MG: 2 INJECTION INTRAMUSCULAR; INTRAVENOUS at 07:35:00

## 2022-06-30 RX ADMIN — FAMOTIDINE 20 MG: 10 INJECTION, SOLUTION INTRAVENOUS at 07:26:00

## 2022-06-30 RX ADMIN — DEXAMETHASONE SODIUM PHOSPHATE 4 MG: 4 MG/ML VIAL (ML) INJECTION at 07:35:00

## 2022-06-30 RX ADMIN — CEFAZOLIN SODIUM/WATER 2 G: 2 G/20 ML SYRINGE (ML) INTRAVENOUS at 07:35:00

## 2022-06-30 RX ADMIN — SODIUM CHLORIDE, SODIUM LACTATE, POTASSIUM CHLORIDE, CALCIUM CHLORIDE: 600; 310; 30; 20 INJECTION, SOLUTION INTRAVENOUS at 08:17:00

## 2022-06-30 NOTE — BRIEF OP NOTE
Pre-Operative Diagnosis: Intraductal papilloma [D36.9]     Post-Operative Diagnosis: Intraductal papilloma [D36.9]      Procedure Performed:   Left breast terminal duct excision and  arden seed excisional biopsy    Surgeon(s) and Role:     Mateo Hernandez MD - Primary    Assistant(s):  Surgical Assistant.: Phani Bassett CSA     Surgical Findings: Seed and  in xray     Specimen: L terminal duct excision with excision of papilloma     Estimated Blood Loss: Blood Output: 5 mL (6/30/2022  8:05 AM)    Jaida Goins MD  6/30/2022  8:25 AM

## 2022-07-01 NOTE — OPERATIVE REPORT
[address]    PATIENT'S NAME: Mellissa Chin   ATTENDING PHYSICIAN: David Gaming M.D. OPERATING PHYSICIAN: David Gaming M.D. PATIENT ACCOUNT#:   [de-identified]    LOCATION:  [facility] 11 EDWP 10  MEDICAL RECORD #:   [id number]       YOB: 1945  ADMISSION DATE:       06/30/2022      OPERATION DATE:  06/30/2022    OPERATIVE REPORT    PREOPERATIVE DIAGNOSIS:  Left breast intraductal papilloma with left spontaneous nipple discharge. POSTOPERATIVE DIAGNOSIS:  Left breast intraductal papilloma with left spontaneous nipple discharge. PROCEDURE:  Left VAHID  localized excision of papilloma with left terminal duct excision and left breast specimen radiography. ASSISTANT:  Rima Pizarro CSA. ANESTHESIA:  Monitored anesthesia care and local.    ESTIMATED BLOOD LOSS:  5 mL. DRAINS:  None. COMPLICATIONS:  None. DISPOSITION:  Stable on transfer to the recovery room. INDICATIONS:  The patient is a 31-year-old female presenting with unilateral left nipple discharge. She has had imaging remotely that demonstrated no suspicious findings. We did an MRI and found non-mass enhancement in the subareolar left breast.  She had a biopsy that confirmed a papilloma. We discussed standard of care to be an excision of this. However, the patient had persistent discharge following the biopsy. It is unclear if this duct was related to the aforementioned papilloma and, therefore, I have recommended a localized excision of the papilloma with left terminal duct excision for both therapeutic as well as diagnostic purposes. Risks and possible complications were discussed with the patient including, but not limited to, infection, bleeding, injury to surrounding structures, possible need for reoperation. She agreed to the proposed surgery.     OPERATIVE TECHNIQUE:  Patient was brought to the imaging suite preoperatively for placement of a VAHID  at the spot of the previously biopsied papilloma. She was taken to the OR. She was placed in supine position, properly padded and secured, given a dose of IV antibiotics. Sequential compression devices were applied to her legs for DVT prophylaxis. Monitored anesthesia care was induced. The left breast was prepped and draped in the usual sterile fashion. Lidocaine 1% with epinephrine was used to infiltrate the skin and subcutaneous tissues at the targeted incision site. A curvilinear incision was made along the lateral areolar border with a 15 blade knife in the skin. Attention was taken toward the duct with the serous discharge. A lacrimal probe was inserted into this duct to dilate and define the offending duct. With the lacrimal duct probe in place, the duct containing this probe was dissected and excised from directly subareolar down to excision into what appeared to be normal-looking breast parenchyma. Superior and medial to this, the uptake was noted with the VAHID  probe. Therefore, wide local excision of this papilloma area was performed and the specimen was contiguous with the left terminal duct excision. The entire specimen was removed, oriented with a short stitch, no clip at the proximal duct, a short stitch, single clip at the superior border, and a long stitch, double clip at the lateral border. This was placed in the specimen radiograph machine where the x-ray denoted the presence of the previously placed coil clip, VAHID  with adequate margins as deemed by myself. The specimen was then sent for permanent pathologic evaluation. Wound was irrigated. Clip was placed at the most distal aspect of the duct for future surveillance. Deep tissue reapproximated with running 3-0 chromic suture. A pexy of the nipple was performed with a 3-0 chromic purse-string at the base of the nipple. Closure was accomplished with interrupted 3-0 Vicryl for deep layer, running 4-0 subcuticular Monocryl for skin. Mastisol and Steri-Strips were applied. Marcaine 0.5% was instilled in the cavity to assist with postoperative analgesia. A sterile dressing and compression bra were applied. Her blood loss was minimal.  All counts were correct at the conclusion of the procedure. She tolerated the procedure well. She was transferred to the recovery area in stable condition. Dictated By Destinee Moss.  Braulio Wade M.D.  d: 06/30/2022 08:36:08  t: 06/30/2022 16:09:01  [place] [id number]  [facility]/    cc: Dr. Cheri Dolan M.D.

## 2022-07-07 ENCOUNTER — OFFICE VISIT (OUTPATIENT)
Dept: SURGERY | Facility: CLINIC | Age: 77
End: 2022-07-07
Payer: MEDICARE

## 2022-07-07 VITALS
SYSTOLIC BLOOD PRESSURE: 156 MMHG | TEMPERATURE: 98 F | BODY MASS INDEX: 36.21 KG/M2 | OXYGEN SATURATION: 95 % | HEIGHT: 62.5 IN | WEIGHT: 201.81 LBS | RESPIRATION RATE: 18 BRPM | DIASTOLIC BLOOD PRESSURE: 76 MMHG | HEART RATE: 63 BPM

## 2022-07-07 DIAGNOSIS — D36.9 INTRADUCTAL PAPILLOMA: Primary | ICD-10-CM

## 2022-07-07 PROCEDURE — 99024 POSTOP FOLLOW-UP VISIT: CPT

## 2022-07-18 ENCOUNTER — OFFICE VISIT (OUTPATIENT)
Dept: SURGERY | Facility: CLINIC | Age: 77
End: 2022-07-18
Payer: MEDICARE

## 2022-07-18 VITALS
OXYGEN SATURATION: 96 % | HEART RATE: 58 BPM | TEMPERATURE: 97 F | SYSTOLIC BLOOD PRESSURE: 168 MMHG | BODY MASS INDEX: 36.42 KG/M2 | WEIGHT: 203 LBS | DIASTOLIC BLOOD PRESSURE: 88 MMHG | HEIGHT: 62.5 IN

## 2022-07-18 DIAGNOSIS — D36.9 INTRADUCTAL PAPILLOMA: Primary | ICD-10-CM

## 2022-07-18 DIAGNOSIS — Z12.31 SCREENING MAMMOGRAM, ENCOUNTER FOR: ICD-10-CM

## 2022-07-18 PROCEDURE — 99024 POSTOP FOLLOW-UP VISIT: CPT | Performed by: SURGERY

## 2022-09-05 ENCOUNTER — OFFICE VISIT (OUTPATIENT)
Dept: FAMILY MEDICINE CLINIC | Facility: CLINIC | Age: 77
End: 2022-09-05
Payer: MEDICARE

## 2022-09-05 VITALS
BODY MASS INDEX: 35.44 KG/M2 | TEMPERATURE: 97 F | HEIGHT: 63 IN | RESPIRATION RATE: 18 BRPM | OXYGEN SATURATION: 96 % | HEART RATE: 61 BPM | DIASTOLIC BLOOD PRESSURE: 72 MMHG | WEIGHT: 200 LBS | SYSTOLIC BLOOD PRESSURE: 138 MMHG

## 2022-09-05 DIAGNOSIS — J06.9 UPPER RESPIRATORY TRACT INFECTION, UNSPECIFIED TYPE: Primary | ICD-10-CM

## 2022-09-05 DIAGNOSIS — J02.9 SORE THROAT: ICD-10-CM

## 2022-09-05 LAB
CONTROL LINE PRESENT WITH A CLEAR BACKGROUND (YES/NO): YES YES/NO
KIT LOT #: NORMAL NUMERIC
STREP GRP A CUL-SCR: NEGATIVE

## 2022-09-06 LAB — SARS-COV-2 RNA RESP QL NAA+PROBE: NOT DETECTED

## 2022-11-07 ENCOUNTER — HOSPITAL ENCOUNTER (EMERGENCY)
Age: 77
Discharge: HOME | End: 2022-11-07
Attending: EMERGENCY MEDICINE
Payer: MEDICARE

## 2022-11-07 VITALS
RESPIRATION RATE: 16 BRPM | DIASTOLIC BLOOD PRESSURE: 51 MMHG | SYSTOLIC BLOOD PRESSURE: 153 MMHG | TEMPERATURE: 98 F | HEART RATE: 76 BPM | BODY MASS INDEX: 35.44 KG/M2 | HEIGHT: 63 IN | OXYGEN SATURATION: 99 % | WEIGHT: 200 LBS

## 2022-11-07 DIAGNOSIS — L50.9 HIVES: Primary | ICD-10-CM

## 2022-11-07 PROCEDURE — 99283 EMERGENCY DEPT VISIT LOW MDM: CPT | Performed by: EMERGENCY MEDICINE

## 2022-11-07 RX ORDER — FAMOTIDINE 20 MG/1
20 TABLET, FILM COATED ORAL 2 TIMES DAILY
Qty: 10 TABLET | Refills: 0 | Status: SHIPPED | OUTPATIENT
Start: 2022-11-07 | End: 2022-11-12

## 2022-11-07 RX ORDER — DIPHENHYDRAMINE HCL 25 MG
25 CAPSULE ORAL ONCE
Status: COMPLETED | OUTPATIENT
Start: 2022-11-07 | End: 2022-11-07

## 2022-11-07 RX ORDER — PREDNISONE 20 MG/1
60 TABLET ORAL ONCE
Status: COMPLETED | OUTPATIENT
Start: 2022-11-07 | End: 2022-11-07

## 2022-11-07 RX ORDER — FAMOTIDINE 20 MG/1
20 TABLET, FILM COATED ORAL ONCE
Status: COMPLETED | OUTPATIENT
Start: 2022-11-07 | End: 2022-11-07

## 2022-11-07 RX ORDER — PREDNISONE 20 MG/1
40 TABLET ORAL DAILY
Qty: 10 TABLET | Refills: 0 | Status: SHIPPED | OUTPATIENT
Start: 2022-11-07 | End: 2022-11-12

## 2022-11-07 NOTE — DISCHARGE INSTRUCTIONS
Please follow-up with your primary care physician 1-2 days return to the ER if your symptoms worsen progress or if you have any further concerns. Please follow-up with an allergy immunology physician closely. Call in the morning to schedule an appointment to be seen as soon as possible. Take Benadryl 25 to 50 mg every 6-8 hours until your hives resolved. Finish the course of prednisone as prescribed. Finish the course of Pepcid or famotidine as prescribed. If you develop difficulty breathing itching in the back your throat or tongue swelling or lip swelling return to the ER immediately for further evaluation and treatment.

## 2022-12-27 ENCOUNTER — OFFICE VISIT (OUTPATIENT)
Dept: SURGERY | Facility: CLINIC | Age: 77
End: 2022-12-27
Payer: MEDICARE

## 2022-12-27 VITALS
TEMPERATURE: 97 F | WEIGHT: 200 LBS | HEART RATE: 59 BPM | RESPIRATION RATE: 18 BRPM | OXYGEN SATURATION: 92 % | BODY MASS INDEX: 35 KG/M2 | DIASTOLIC BLOOD PRESSURE: 81 MMHG | SYSTOLIC BLOOD PRESSURE: 148 MMHG

## 2022-12-27 DIAGNOSIS — N64.4 BREAST PAIN, LEFT: Primary | ICD-10-CM

## 2022-12-27 PROCEDURE — 99214 OFFICE O/P EST MOD 30 MIN: CPT

## 2023-01-03 ENCOUNTER — HOSPITAL ENCOUNTER (OUTPATIENT)
Dept: MAMMOGRAPHY | Age: 78
Discharge: HOME | End: 2023-01-03
Payer: COMMERCIAL

## 2023-01-03 ENCOUNTER — HOSPITAL ENCOUNTER (OUTPATIENT)
Dept: ULTRASOUND IMAGING | Age: 78
Discharge: HOME | End: 2023-01-03
Payer: COMMERCIAL

## 2023-01-03 DIAGNOSIS — N64.4 BREAST PAIN, LEFT: ICD-10-CM

## 2023-01-03 PROCEDURE — 77062 BREAST TOMOSYNTHESIS BI: CPT

## 2023-01-03 PROCEDURE — 76642 ULTRASOUND BREAST LIMITED: CPT

## 2023-01-03 PROCEDURE — 77066 DX MAMMO INCL CAD BI: CPT

## 2023-01-04 ENCOUNTER — TELEPHONE (OUTPATIENT)
Dept: SURGERY | Facility: CLINIC | Age: 78
End: 2023-01-04

## 2023-01-04 NOTE — TELEPHONE ENCOUNTER
Calling patient with results of her mammogram. Pt stated her pain is feeling better in her left breast, she has not had any in the last week. She would like to keep her March appointment for now and will call the office if her pain increases.

## 2023-03-07 ENCOUNTER — OFFICE VISIT (OUTPATIENT)
Dept: SURGERY | Facility: CLINIC | Age: 78
End: 2023-03-07
Payer: COMMERCIAL

## 2023-03-07 VITALS
OXYGEN SATURATION: 95 % | HEART RATE: 61 BPM | WEIGHT: 211 LBS | BODY MASS INDEX: 37.39 KG/M2 | HEIGHT: 63 IN | RESPIRATION RATE: 16 BRPM | TEMPERATURE: 97 F | DIASTOLIC BLOOD PRESSURE: 91 MMHG | SYSTOLIC BLOOD PRESSURE: 160 MMHG

## 2023-03-07 DIAGNOSIS — D36.9 INTRADUCTAL PAPILLOMA: ICD-10-CM

## 2023-03-07 DIAGNOSIS — N64.4 BREAST PAIN, LEFT: Primary | ICD-10-CM

## 2023-03-07 PROCEDURE — 3080F DIAST BP >= 90 MM HG: CPT | Performed by: SURGERY

## 2023-03-07 PROCEDURE — 99213 OFFICE O/P EST LOW 20 MIN: CPT | Performed by: SURGERY

## 2023-03-07 PROCEDURE — 3008F BODY MASS INDEX DOCD: CPT | Performed by: SURGERY

## 2023-03-07 PROCEDURE — 3077F SYST BP >= 140 MM HG: CPT | Performed by: SURGERY

## 2023-03-16 PROBLEM — N64.4 BREAST PAIN, LEFT: Status: ACTIVE | Noted: 2023-03-16

## 2023-03-16 PROBLEM — D36.9 INTRADUCTAL PAPILLOMA: Status: ACTIVE | Noted: 2023-03-16

## 2023-09-27 ENCOUNTER — HOSPITAL ENCOUNTER (EMERGENCY)
Age: 78
Discharge: HOME | End: 2023-09-27
Attending: EMERGENCY MEDICINE
Payer: COMMERCIAL

## 2023-09-27 VITALS
BODY MASS INDEX: 40.12 KG/M2 | HEIGHT: 62 IN | WEIGHT: 218 LBS | OXYGEN SATURATION: 99 % | TEMPERATURE: 99 F | HEART RATE: 68 BPM | RESPIRATION RATE: 18 BRPM | SYSTOLIC BLOOD PRESSURE: 142 MMHG | DIASTOLIC BLOOD PRESSURE: 62 MMHG

## 2023-09-27 DIAGNOSIS — N30.00 ACUTE CYSTITIS WITHOUT HEMATURIA: Primary | ICD-10-CM

## 2023-09-27 LAB
POCT INFLUENZA A: NEGATIVE
POCT INFLUENZA B: NEGATIVE
SARS-COV-2 RNA RESP QL NAA+PROBE: NOT DETECTED

## 2023-09-27 PROCEDURE — 99284 EMERGENCY DEPT VISIT MOD MDM: CPT

## 2023-09-27 PROCEDURE — 87086 URINE CULTURE/COLONY COUNT: CPT | Performed by: EMERGENCY MEDICINE

## 2023-09-27 PROCEDURE — 96365 THER/PROPH/DIAG IV INF INIT: CPT

## 2023-09-27 PROCEDURE — 87502 INFLUENZA DNA AMP PROBE: CPT | Performed by: EMERGENCY MEDICINE

## 2023-09-27 RX ORDER — ASPIRIN 325 MG
325 TABLET ORAL 2 TIMES DAILY
COMMUNITY

## 2023-09-28 NOTE — ED INITIAL ASSESSMENT (HPI)
Since 1700 patient began having chills. Temps of 99. Right knee replacement on 8/31. Patient has been on Nitrofurantoin today for UTI and taken 2 doses.

## 2023-12-01 ENCOUNTER — APPOINTMENT (OUTPATIENT)
Dept: GENERAL RADIOLOGY | Age: 78
End: 2023-12-01
Attending: EMERGENCY MEDICINE
Payer: MEDICARE

## 2023-12-01 ENCOUNTER — HOSPITAL ENCOUNTER (EMERGENCY)
Age: 78
Discharge: HOME | End: 2023-12-01
Attending: EMERGENCY MEDICINE
Payer: MEDICARE

## 2023-12-01 VITALS
SYSTOLIC BLOOD PRESSURE: 141 MMHG | TEMPERATURE: 99 F | DIASTOLIC BLOOD PRESSURE: 78 MMHG | OXYGEN SATURATION: 97 % | BODY MASS INDEX: 34.96 KG/M2 | HEIGHT: 62 IN | RESPIRATION RATE: 16 BRPM | HEART RATE: 64 BPM | WEIGHT: 190 LBS

## 2023-12-01 DIAGNOSIS — S80.01XA CONTUSION OF RIGHT KNEE, INITIAL ENCOUNTER: ICD-10-CM

## 2023-12-01 DIAGNOSIS — S92.352A CLOSED DISPLACED FRACTURE OF FIFTH METATARSAL BONE OF LEFT FOOT, INITIAL ENCOUNTER: Primary | ICD-10-CM

## 2023-12-01 PROCEDURE — 73630 X-RAY EXAM OF FOOT: CPT | Performed by: EMERGENCY MEDICINE

## 2023-12-01 PROCEDURE — 99284 EMERGENCY DEPT VISIT MOD MDM: CPT

## 2023-12-01 PROCEDURE — 28470 CLTX METATARSAL FX WO MNP EA: CPT

## 2023-12-01 PROCEDURE — 73562 X-RAY EXAM OF KNEE 3: CPT | Performed by: EMERGENCY MEDICINE

## 2023-12-01 RX ORDER — LEVOTHYROXINE SODIUM 88 UG/1
88 TABLET ORAL
COMMUNITY

## 2023-12-01 NOTE — ED INITIAL ASSESSMENT (HPI)
Pt reports lt foot and rt knee pain after fall yesterday. Pt states she stepped in a hole in the ground and fell to lt side twisting lt foot. Pt denies head injury.

## 2023-12-01 NOTE — DISCHARGE INSTRUCTIONS
Be nonweightbearing on the left foot. Ice and elevate foot. Take Tylenol or Advil for pain. Follow-up with orthopedic surgery next week.

## 2024-02-20 ENCOUNTER — HOSPITAL ENCOUNTER (OUTPATIENT)
Dept: MAMMOGRAPHY | Age: 79
Discharge: HOME | End: 2024-02-20
Attending: SURGERY
Payer: MEDICARE

## 2024-02-20 ENCOUNTER — HOSPITAL ENCOUNTER (OUTPATIENT)
Dept: ULTRASOUND IMAGING | Age: 79
Discharge: HOME | End: 2024-02-20
Attending: SURGERY
Payer: MEDICARE

## 2024-02-20 DIAGNOSIS — N64.4 BREAST PAIN, LEFT: ICD-10-CM

## 2024-02-20 PROCEDURE — 77066 DX MAMMO INCL CAD BI: CPT | Performed by: SURGERY

## 2024-02-20 PROCEDURE — 76642 ULTRASOUND BREAST LIMITED: CPT | Performed by: SURGERY

## 2024-02-20 PROCEDURE — 77062 BREAST TOMOSYNTHESIS BI: CPT | Performed by: SURGERY

## 2024-07-30 ENCOUNTER — OFFICE VISIT (OUTPATIENT)
Dept: FAMILY MEDICINE CLINIC | Facility: CLINIC | Age: 79
End: 2024-07-30
Payer: MEDICARE

## 2024-07-30 ENCOUNTER — HOSPITAL ENCOUNTER (EMERGENCY)
Age: 79
Discharge: HOME | End: 2024-07-30
Attending: EMERGENCY MEDICINE
Payer: MEDICARE

## 2024-07-30 VITALS
WEIGHT: 204 LBS | TEMPERATURE: 98 F | DIASTOLIC BLOOD PRESSURE: 80 MMHG | HEART RATE: 58 BPM | SYSTOLIC BLOOD PRESSURE: 180 MMHG | OXYGEN SATURATION: 94 % | HEIGHT: 62 IN | RESPIRATION RATE: 28 BRPM | BODY MASS INDEX: 37.54 KG/M2

## 2024-07-30 VITALS
TEMPERATURE: 98 F | BODY MASS INDEX: 37.54 KG/M2 | OXYGEN SATURATION: 93 % | SYSTOLIC BLOOD PRESSURE: 139 MMHG | HEIGHT: 62 IN | WEIGHT: 204 LBS | HEART RATE: 60 BPM | RESPIRATION RATE: 18 BRPM | DIASTOLIC BLOOD PRESSURE: 89 MMHG

## 2024-07-30 DIAGNOSIS — T78.40XA ALLERGIC REACTION, INITIAL ENCOUNTER: Primary | ICD-10-CM

## 2024-07-30 DIAGNOSIS — L50.0 ALLERGIC URTICARIA: Primary | ICD-10-CM

## 2024-07-30 PROCEDURE — 99284 EMERGENCY DEPT VISIT MOD MDM: CPT

## 2024-07-30 PROCEDURE — 96372 THER/PROPH/DIAG INJ SC/IM: CPT | Performed by: PHYSICIAN ASSISTANT

## 2024-07-30 PROCEDURE — 99283 EMERGENCY DEPT VISIT LOW MDM: CPT

## 2024-07-30 PROCEDURE — 99215 OFFICE O/P EST HI 40 MIN: CPT | Performed by: PHYSICIAN ASSISTANT

## 2024-07-30 PROCEDURE — 3079F DIAST BP 80-89 MM HG: CPT | Performed by: PHYSICIAN ASSISTANT

## 2024-07-30 PROCEDURE — 3077F SYST BP >= 140 MM HG: CPT | Performed by: PHYSICIAN ASSISTANT

## 2024-07-30 PROCEDURE — 3008F BODY MASS INDEX DOCD: CPT | Performed by: PHYSICIAN ASSISTANT

## 2024-07-30 RX ORDER — FAMOTIDINE 20 MG/1
20 TABLET, FILM COATED ORAL ONCE
Status: COMPLETED | OUTPATIENT
Start: 2024-07-30 | End: 2024-07-30

## 2024-07-30 RX ORDER — EPINEPHRINE 0.3 MG/.3ML
0.3 INJECTION SUBCUTANEOUS
Qty: 1 EACH | Refills: 0 | Status: SHIPPED | OUTPATIENT
Start: 2024-07-30 | End: 2024-08-29

## 2024-07-30 RX ORDER — DIPHENHYDRAMINE HCL 25 MG
25 CAPSULE ORAL EVERY 6 HOURS PRN
Qty: 20 CAPSULE | Refills: 0 | Status: SHIPPED | OUTPATIENT
Start: 2024-07-30 | End: 2024-08-04

## 2024-07-30 RX ORDER — DIPHENHYDRAMINE HYDROCHLORIDE 50 MG/ML
25 INJECTION INTRAMUSCULAR; INTRAVENOUS ONCE
Status: COMPLETED | OUTPATIENT
Start: 2024-07-30 | End: 2024-07-30

## 2024-07-30 RX ORDER — FAMOTIDINE 20 MG/1
20 TABLET, FILM COATED ORAL 2 TIMES DAILY PRN
Qty: 10 TABLET | Refills: 0 | Status: SHIPPED | OUTPATIENT
Start: 2024-07-30 | End: 2024-08-04

## 2024-07-30 RX ORDER — PREDNISONE 20 MG/1
60 TABLET ORAL ONCE
Status: COMPLETED | OUTPATIENT
Start: 2024-07-30 | End: 2024-07-30

## 2024-07-30 RX ORDER — ATORVASTATIN CALCIUM 40 MG/1
40 TABLET, FILM COATED ORAL DAILY
COMMUNITY

## 2024-07-30 RX ORDER — PREDNISONE 20 MG/1
40 TABLET ORAL DAILY
Qty: 10 TABLET | Refills: 0 | Status: SHIPPED | OUTPATIENT
Start: 2024-07-30 | End: 2024-08-04

## 2024-07-30 RX ADMIN — DIPHENHYDRAMINE HYDROCHLORIDE 25 MG: 50 INJECTION INTRAMUSCULAR; INTRAVENOUS at 15:05:00

## 2024-07-30 NOTE — ED PROVIDER NOTES
Patient Seen in: ward Emergency Department In [place]      History     Chief Complaint   Patient presents with    Bite Sting,Insect     Stated Complaint: Pt sent over from [facility] care - bit by a hornet a couple of hours ago and notice*    Subjective:   HPI    78-year-old female who presents to the emergency department after she was stuck by hornets couple hours prior to arriving.  The patient that she was stuck in her abdomen and her left leg but noticed that she had hives on her arms as well as her lower extremities.  She denies any difficulty swallowing or breathing.  No history of allergies to hymenoptera.  Reviewing her records she was seen at the immediate care today 7/30/2024 and given 50 mg of Benadryl.  She is also taking Benadryl at home.    Objective:   Past Medical History:    Back problem    \"disc 5 out of wack\" yrs ago    Esophageal reflux    Essential hypertension    Hyperlipidemia    Hyperthyroidism    Intraductal papilloma of left breast    surgery scheduled 6/30/22    Osteoporosis    advanced    Personal history of COVID-19    fever, body aches, loss of taste and smell- no hospitalization              Past Surgical History:   Procedure Laterality Date    Colonoscopy      Knee replacement surgery      Esperanza localization wire 1 site left (cpt=19281) Left 06/30/2022    intraductal papilloma    Esperanza localization wire 1 site right (cpt=19281)      Benign, many years ago    Other      Procedure in Mammography 6/15/22 Alice  seed    Tonsillectomy      Upper gi endoscopy,exam                  No pertinent social history.            Review of Systems    Positive for stated Chief Complaint: Bite Sting,Insect    Other systems are as noted in HPI.  Constitutional and vital signs reviewed.      All other systems reviewed and negative except as noted above.    Physical Exam     ED Triage Vitals [07/30/24 1525]   /89   Pulse 60   Resp 18   Temp 98 °F (36.7 °C)   Temp src Oral   SpO2 93 %   O2 Device  None (Room air)       Current Vitals:   Vital Signs  BP: 139/89  Pulse: 60  Resp: 18  Temp: 98 °F (36.7 °C)  Temp src: Oral    Oxygen Therapy  SpO2: 93 %  O2 Device: None (Room air)            Physical Exam  General: Nontoxic 78-year-old female appears to be no distress.  HEENT: Oral mucosa is moist tongue is midline.  No lip swelling.  No oral mucosal swelling.  No uvular swelling.  No stridorous breathing on auscultation of the trachea with forced expiration.  Lungs: Clear bilateral without wheezes or rhonchi.  Cardiac: Heart rate is 60 normal S1-2 without murmurs or ectopy  Abdomen: Soft without tenderness.  There is localized area of inflammation from her hornet sting on the left lower abdomen.  Extremities: There is some erythema on the left lower extremity from the hornet sting they are resolving hives on the upper extremities bilaterally.  Left greater than right.  ED Course   Labs Reviewed - No data to display                   MDM    The patient was given Pepcid and prednisone.  Differential diagnosis includes but is not limited to systemic allergic reaction, multiple hornet stings with localized hives.  The patient claims that she only had 2 stings but had hives diffusely.  The patient at the time was cutting bushes and says she may have disturbed a hornets nest.  The patient may have had multiple stings but she says she only felt 2 stings on her abdomen and her left leg.  With her having systemic hives concerned that she has systemic inflammatory response to the hymenoptera.  She will receive the Benadryl, Pepcid, and prednisone.  She will be written for an EpiPen to have for home.  Suggest she follow with an allergist as an outpatient.  Return if symptoms progress or worsen.  Note to Patient  The 21st Century Cures Act makes medical notes like these available to patients in the interest of transparency. However, be advised this is a medical document and is intended as [id number] communication; it is  written in medical language and may appear blunt, direct, or contain abbreviations or verbiage that are unfamiliar. Medical documents are intended to carry relevant information, facts as evident, and the clinical opinion of the practitioner.  Patient was evaluated and a screening exam was performed.   As a treating physician attending to the patient, I determined, within reasonable clinical confidence and prior to discharge, that an emergency medical condition was not or was no longer present.  There was no indication for further evaluation, treatment or admission on an emergency basis.  Comprehensive verbal and written discharge and follow-up instructions were provided to help prevent relapse or worsening.  Patient was instructed to follow-up with their primary care provider for further evaluation and treatment, but to return immediately to the ER for worsening, concerning, new, changing or persisting symptoms.  I discussed the case with the patient and they had no questions, complaints, or concerns.  Patient felt comfortable going home.  ^^Please note that this report has been produced using speech recognition software and may contain errors related to that system including, but not limited to, errors in grammar, punctuation, and spelling, as well as words and phrases that possibly may have been recognized inappropriately.  If there are any questions or concerns, contact the dictating provider for clarification.                           Medical Decision Making      Disposition and Plan     Clinical Impression:  1. Allergic urticaria         Disposition:  Discharge  7/30/2024  3:47 pm    Follow-up:  David Verdugo  [address] [place] [id number] [phone]    Schedule an appointment as soon as possible for a visit in 2 day(s)            Medications Prescribed:  Current Discharge Medication List        START taking these medications    Details   famotidine (PEPCID) 20 MG Oral Tab Take 1  tablet (20 mg total) by mouth 2 (two) times daily as needed for Heartburn.  Qty: 10 tablet, Refills: 0      predniSONE 20 MG Oral Tab Take 2 tablets (40 mg total) by mouth daily for 5 days.  Qty: 10 tablet, Refills: 0      diphenhydrAMINE 25 MG Oral Cap Take 1 capsule (25 mg total) by mouth every 6 (six) hours as needed for Itching.  Qty: 20 capsule, Refills: 0      EPINEPHrine 0.3 MG/0.3ML Injection Solution Auto-injector Inject 0.3 mL (1 each total) into the muscle daily as needed.  Qty: 1 each, Refills: 0

## 2024-07-30 NOTE — ED INITIAL ASSESSMENT (HPI)
Patient here after being stung by a hornet a few hours prior to arrival. States she went to urgent care due to itchy rash. Sent here for further evaluation. Took benadryl PO at home and received injection at urgent care. States rash has improved. Denies shortness of breath.

## 2024-07-30 NOTE — PROGRESS NOTES
CHIEF COMPLAINT:     Chief Complaint   Patient presents with    Insect Bite     Redness, swelling, itchy at L/R arms/palms and hornet sting on left upper thigh area.  Per pt. Throat feels funny. S/s for 1 hours.  OTC Benadryl taken.         HPI:   Shruti Orellana is a 78 year old female who presents with complaints of insect sting.  The patient reports she was stung by a wasp in the  left abdomen and leg hip 1.5 hours ago.  The patient now had a diffuse rash and reports her throat feels funny.  The patient denies difficulty breath, difficulty swallowing, and syncope.  She denies history of severe reactions in the past.  The patient denies distress, but reports she is upset it happened.  The patient took 25 mg of benadryl 20 minutes prior to arrival at the clinic.     Current Outpatient Medications   Medication Sig Dispense Refill    levothyroxine (SYNTHROID) 88 MCG Oral Tab Take 1 tablet (88 mcg total) by mouth before breakfast.      aspirin 325 MG Oral Tab Take 1 tablet (325 mg total) by mouth in the morning and 1 tablet (325 mg total) before bedtime.      levothyroxine 88 MCG Oral Tab Take 1 tablet (88 mcg total) by mouth before breakfast.      metoprolol succinate ER 50 MG Oral Tablet 24 Hr Take 1 tablet (50 mg total) by mouth daily.      Omeprazole 40 MG Oral Capsule Delayed Release Take 1 capsule (40 mg total) by mouth daily. Before meal (Patient taking differently: Take 1 capsule (40 mg total) by mouth as needed. Before meal) 30 capsule 11    latanoprost 0.005 % Ophthalmic Solution Place 1 drop into both eyes nightly.        Past Medical History:    Back problem    \"disc 5 out of wack\" yrs ago    Esophageal reflux    Essential hypertension    Hyperthyroidism    Intraductal papilloma of left breast    surgery scheduled 6/30/22    Osteoporosis    advanced    Personal history of COVID-19    fever, body aches, loss of taste and smell- no hospitalization      Social History:  Social History     Socioeconomic  History    Marital status:    Tobacco Use    Smoking status: Never    Smokeless tobacco: Never   Vaping Use    Vaping status: Never Used   Substance and Sexual Activity    Alcohol use: No    Drug use: No     Social Determinants of Health      Received from [facility]        REVIEW OF SYSTEMS:   GENERAL: Denies fever, chills,weight change, decreased appetite  SKIN: See HPI  EYES: Denies blurred vision or double vision  HENT: Denies congestion, rhinorrhea, sore throat or ear pain  CHEST: Denies chest pain, or palpitations  LUNGS: Denies shortness of breath, cough, or wheezing  GI: Denies abdominal pain, N/V/C/D.   MUSCULOSKELETAL: no arthralgia or swollen joints  LYMPH:  Denies lymphadenopathy  NEURO: Denies headaches or lightheadedness      EXAM:   BP (!) 180/80   Pulse 58   Temp 98 °F (36.7 °C) (Oral)   Resp (!) 28   Ht 5' 2\" (1.575 m)   Wt 204 lb (92.5 kg)   SpO2 94%   BMI 37.31 kg/m²   GENERAL: well developed, well nourished,in no apparent distress, cooperative   SKIN: Diffuse hives to the extremities and torso.  HEAD: atraumatic, normocephalic  EYES: EOM intact, PERRL.  Conjunctiva normal.  Cornea clear.  Lid margins normal.  No active drainage.  EARS: Right TM normal, no bulging, no retraction, no fluid, bony landmarks normal.  Left TM normal, no bulging, no retraction, no fluid, bony landmarks normal.    NOSE: nostrils patent, no discharge, nasal mucosa pink and not inflamed.  No sinus tenderness.  THROAT: oral mucosa pink, moist and intact. No visible dental caries. Posterior pharynx without erythema or exudates.  No swelling of the lips/tongue/throat.  NECK: supple, non-tender.  LUNGS: clear to auscultation bilaterally, no wheezes or rhonchi. Breathing is non labored.  CARDIO: RRR without murmur  GI: No visible scars, or masses. BS's present x4. No palpable masses or hepatosplenomegaly.  Non tender.  No guarding or rebound tenderness  EXTREMITIES: no cyanosis, clubbing or edema.   Homans NEG.  Dorsalis Pedis 2+.  LYMPH:  No lymphadenopathy.    NEURO: A&Ox3.  CN II-XII intact.  No focal deficits.  Coordination and Gait normal.  Kernig and Brudzinski's are negative.    Patient agreeable to ER evaluation but refused ambulance transfer to ER and signed out AMA.       ASSESSMENT AND PLAN:     ASSESSMENT:  Encounter Diagnosis   Name Primary?    Allergic reaction, initial encounter Yes       PLAN:    There are no Patient Instructions on file for this visit.

## 2024-08-01 ENCOUNTER — HOSPITAL ENCOUNTER (EMERGENCY)
Age: 79
Discharge: HOME | End: 2024-08-01
Attending: EMERGENCY MEDICINE
Payer: MEDICARE

## 2024-08-01 VITALS
TEMPERATURE: 99 F | SYSTOLIC BLOOD PRESSURE: 169 MMHG | HEART RATE: 62 BPM | BODY MASS INDEX: 37.54 KG/M2 | DIASTOLIC BLOOD PRESSURE: 66 MMHG | RESPIRATION RATE: 20 BRPM | HEIGHT: 62 IN | WEIGHT: 204 LBS | OXYGEN SATURATION: 95 %

## 2024-08-01 DIAGNOSIS — T78.40XA ALLERGIC REACTION, INITIAL ENCOUNTER: Primary | ICD-10-CM

## 2024-08-01 PROCEDURE — 96375 TX/PRO/DX INJ NEW DRUG ADDON: CPT

## 2024-08-01 PROCEDURE — 99283 EMERGENCY DEPT VISIT LOW MDM: CPT

## 2024-08-01 PROCEDURE — 96374 THER/PROPH/DIAG INJ IV PUSH: CPT

## 2024-08-01 PROCEDURE — 99284 EMERGENCY DEPT VISIT MOD MDM: CPT

## 2024-08-01 PROCEDURE — S0028 INJECTION, FAMOTIDINE, 20 MG: HCPCS | Performed by: EMERGENCY MEDICINE

## 2024-08-01 RX ORDER — DIPHENHYDRAMINE HYDROCHLORIDE 50 MG/ML
25 INJECTION INTRAMUSCULAR; INTRAVENOUS ONCE
Status: COMPLETED | OUTPATIENT
Start: 2024-08-01 | End: 2024-08-01

## 2024-08-01 RX ORDER — FAMOTIDINE 10 MG/ML
20 INJECTION, SOLUTION INTRAVENOUS ONCE
Status: COMPLETED | OUTPATIENT
Start: 2024-08-01 | End: 2024-08-01

## 2024-08-01 RX ORDER — METHYLPREDNISOLONE SODIUM SUCCINATE 125 MG/2ML
125 INJECTION, POWDER, LYOPHILIZED, FOR SOLUTION INTRAMUSCULAR; INTRAVENOUS ONCE
Status: COMPLETED | OUTPATIENT
Start: 2024-08-01 | End: 2024-08-01

## 2024-08-01 NOTE — DISCHARGE INSTRUCTIONS
Continue combination antihistamines and steroids as prescribed.  You will not need a dose of prednisone today  Topical hydrocortisone to areas of rash and swelling on the arms    Contact your primary care doctor today to arrange close follow-up

## 2024-08-01 NOTE — ED PROVIDER NOTES
Patient Seen in: [facility] [place]      History     Chief Complaint   Patient presents with    Rash Skin Problem    Bite Sting,Insect     Stated Complaint: rash and hives, hornet stings two days ago but the blisters are spreading    Subjective:   HPI    Patient was seen in the emergency department after she was stung by hornets a few hours prior to arrival.  She was stung in the abdomen, the arms, and left leg but developed hives on her arms and legs.  She was taking Benadryl at home.  Patient discharged home on prednisone, Pepcid, and Benadryl.  She was also given a prescription for EpiPen.  Patient states that today, she developed a few hives on her forearms.  There is no lip or tongue swelling.  No new shortness of breath.  No chest pain.  Family notes that her feet seem a little swollen.    Objective:   Past Medical History:    Back problem    \"disc 5 out of wack\" yrs ago    Esophageal reflux    Essential hypertension    Hyperlipidemia    Hyperthyroidism    Intraductal papilloma of left breast    surgery scheduled 6/30/22    Osteoporosis    advanced    Personal history of COVID-19    fever, body aches, loss of taste and smell- no hospitalization              Past Surgical History:   Procedure Laterality Date    Colonoscopy      Knee replacement surgery      Esperanza localization wire 1 site left (cpt=19281) Left 06/30/2022    intraductal papilloma    Esperanza localization wire 1 site right (cpt=19281)      Benign, many years ago    Other      Procedure in Mammography 6/15/22 [facility]  seed    Tonsillectomy      Upper gi endoscopy,exam                  Social History     Socioeconomic History    Marital status:    Tobacco Use    Smoking status: Never    Smokeless tobacco: Never   Vaping Use    Vaping status: Never Used   Substance and Sexual Activity    Alcohol use: No    Drug use: No     Social Determinants of Health      Received from [facility]              Review of  Systems    Positive for stated Chief Complaint: Rash Skin Problem and Bite Sting,Insect    Other systems are as noted in HPI.  Constitutional and vital signs reviewed.      All other systems reviewed and negative except as noted above.    Physical Exam     ED Triage Vitals [08/01/24 1317]   BP (!) 169/66   Pulse 60   Resp 20   Temp 98.8 °F (37.1 °C)   Temp src Temporal   SpO2 94 %   O2 Device None (Room air)       Current Vitals:   Vital Signs  BP: (!) 169/66  Pulse: 60  Resp: 20  Temp: 98.8 °F (37.1 °C)  Temp src: Temporal    Oxygen Therapy  SpO2: 94 %  O2 Device: None (Room air)            Physical Exam  General: The patient is awake, alert, conversant.  Her speech is clear and she is breathing comfortably with normal pulse oximetry  Eyes: sclera white, conjunctiva pink and moist.  Lids and lashes are normal.  Throat: Posterior pharynx is normal.  Uvula is midline and nonswollen.  Tongue is nonswollen.  Lips are nonswollen  Neck: Supple  Lungs: Clear to auscultation bilaterally.  No rhonchi or rales.  No stridor or wheeze  Heart: Normal S1 and S2, without murmur.  Distal pulses are strong and symmetric.  Abdomen: Soft, nondistended.  Completely nontender  Extremities: There are some hive like almost bullous lesions noted on the upper extremities bilaterally.   Skin: No target lesions.  No petechia  Neurologic:  Mental status as above.  Patient moves all extremities with good strength and coordination.           ED Course   Labs Reviewed - No data to display                   MDM      Patient having persistent allergic reaction symptoms after hymenoptera envenomation.   No petechia.  No target lesions to suggest erythema multiforme  No lip or tongue swelling, shortness of breath, wheezing, or stridor to suggest systemic anaphylactic reaction.    Patient had not gotten her prescriptions filled.  She had not taken Pepcid today.  She did take 25 mg Benadryl prior to arrival    I will give her a boost of Solu-Medrol  and her Pepcid as well as an additional dose of Benadryl here.  She may apply topical hydrocortisone to the areas of local allergic reaction on her forearms.    Patient was observed for prolonged period of time.  She had no worsening of her condition and the rash on the arms has significantly faded.  Patient felt comfortable in being discharged.  I recommend continuing antihistamines and steroids as prescribed    I recommend close follow-up with her primary care provider.                                   Medical Decision Making      Disposition and Plan     Clinical Impression:  1. Allergic reaction, initial encounter         Disposition:  Discharge  8/1/2024  3:09 pm    Follow-up:  David Verdugo  [address] [id number] [phone]    Call today            Medications Prescribed:  Current Discharge Medication List

## 2024-08-01 NOTE — ED INITIAL ASSESSMENT (HPI)
2 days ago got bit by several hornets and came here due to hives.  Has not been able to  pepcid or epi pen.  States hives have started to return to right forearm, itching to left arm and swelling to both feet.

## 2024-08-23 ENCOUNTER — OFFICE VISIT (OUTPATIENT)
Dept: FAMILY MEDICINE CLINIC | Facility: CLINIC | Age: 79
End: 2024-08-23
Payer: MEDICARE

## 2024-08-23 VITALS
DIASTOLIC BLOOD PRESSURE: 82 MMHG | HEIGHT: 62 IN | RESPIRATION RATE: 18 BRPM | HEART RATE: 64 BPM | TEMPERATURE: 98 F | OXYGEN SATURATION: 95 % | WEIGHT: 204 LBS | BODY MASS INDEX: 37.54 KG/M2 | SYSTOLIC BLOOD PRESSURE: 144 MMHG

## 2024-08-23 DIAGNOSIS — J06.9 UPPER RESPIRATORY TRACT INFECTION, UNSPECIFIED TYPE: Primary | ICD-10-CM

## 2024-08-23 PROBLEM — E66.01 SEVERE OBESITY (BMI 35.0-39.9) WITH COMORBIDITY (HCC): Chronic | Status: ACTIVE | Noted: 2024-08-23

## 2024-08-23 PROCEDURE — 1159F MED LIST DOCD IN RCRD: CPT | Performed by: PHYSICIAN ASSISTANT

## 2024-08-23 PROCEDURE — 3077F SYST BP >= 140 MM HG: CPT | Performed by: PHYSICIAN ASSISTANT

## 2024-08-23 PROCEDURE — 3008F BODY MASS INDEX DOCD: CPT | Performed by: PHYSICIAN ASSISTANT

## 2024-08-23 PROCEDURE — 99213 OFFICE O/P EST LOW 20 MIN: CPT | Performed by: PHYSICIAN ASSISTANT

## 2024-08-23 PROCEDURE — 3079F DIAST BP 80-89 MM HG: CPT | Performed by: PHYSICIAN ASSISTANT

## 2024-08-23 PROCEDURE — 1160F RVW MEDS BY RX/DR IN RCRD: CPT | Performed by: PHYSICIAN ASSISTANT

## 2024-08-23 RX ORDER — ALBUTEROL SULFATE 90 UG/1
AEROSOL, METERED RESPIRATORY (INHALATION)
Qty: 1 EACH | Refills: 0 | Status: SHIPPED | OUTPATIENT
Start: 2024-08-23

## 2024-08-23 NOTE — PROGRESS NOTES
CHIEF COMPLAINT:     Chief Complaint   Patient presents with    Cough     Post nasal drainage for 2-3 days.  RX nasal spray.         HPI:   Shruti Orellana is a 78 year old female who presents with complaints of feeling sick for the past 3 days.  Symptoms include nasal congestion, nasal drainage, dry throat, sinus pressure, and ear pressure.  The patient denies fever, CP, SOB, abdominal pain, vomiting, diarrhea, and rash.  The patient is tolerating po.  The patient denies recent COVID infection.  The patient reports baseline wheezing from previous smoking.     Current Outpatient Medications   Medication Sig Dispense Refill    albuterol (PROAIR HFA) 108 (90 Base) MCG/ACT Inhalation Aero Soln 2 puffs every 4-6 hours prn wheezing or shortness of breath 1 each 0    atorvastatin 40 MG Oral Tab Take 1 tablet (40 mg total) by mouth daily.      EPINEPHrine 0.3 MG/0.3ML Injection Solution Auto-injector Inject 0.3 mL (1 each total) into the muscle daily as needed. 1 each 0    levothyroxine (SYNTHROID) 88 MCG Oral Tab Take 1 tablet (88 mcg total) by mouth before breakfast.      aspirin 325 MG Oral Tab Take 1 tablet (325 mg total) by mouth in the morning and 1 tablet (325 mg total) before bedtime.      levothyroxine 88 MCG Oral Tab Take 1 tablet (88 mcg total) by mouth before breakfast.      metoprolol succinate ER 50 MG Oral Tablet 24 Hr Take 1 tablet (50 mg total) by mouth daily.      Omeprazole 40 MG Oral Capsule Delayed Release Take 1 capsule (40 mg total) by mouth daily. Before meal (Patient taking differently: Take 1 capsule (40 mg total) by mouth as needed. Before meal) 30 capsule 11    latanoprost 0.005 % Ophthalmic Solution Place 1 drop into both eyes nightly.        Past Medical History:    Back problem    \"disc 5 out of wack\" yrs ago    Esophageal reflux    Essential hypertension    Hyperlipidemia    Hyperthyroidism    Intraductal papilloma of left breast    surgery scheduled 6/30/22    Osteoporosis    advanced     Personal history of COVID-19    fever, body aches, loss of taste and smell- no hospitalization      Social History:  Social History     Socioeconomic History    Marital status:    Tobacco Use    Smoking status: Never    Smokeless tobacco: Never   Vaping Use    Vaping status: Never Used   Substance and Sexual Activity    Alcohol use: No    Drug use: No     Social Determinants of Health      Received from [facility]        REVIEW OF SYSTEMS:   GENERAL: Denies fever, chills,weight change, decreased appetite  SKIN: Denies rashes, skin wounds or ulcers.  EYES: Denies blurred vision or double vision  HENT: See HPI  CHEST: Denies chest pain, or palpitations  LUNGS: See HPI  GI: Denies abdominal pain, N/V/C/D.   MUSCULOSKELETAL: no arthralgia or swollen joints  LYMPH:  Denies lymphadenopathy  NEURO: Denies headaches or lightheadedness      EXAM:   /82   Pulse 64   Temp 98.2 °F (36.8 °C) (Oral)   Resp 18   Ht 5' 2\" (1.575 m)   Wt 204 lb (92.5 kg)   SpO2 95%   BMI 37.31 kg/m²   GENERAL: well developed, well nourished,in no apparent distress, cooperative   SKIN: no rashes, nosuspicious lesions, no abnormal pigmentation  HEAD: atraumatic, normocephalic  EYES: EOM intact, PERRL.  Conjunctiva normal.  Cornea clear.  Lid margins normal.  No active drainage.  EARS: Right TM normal, no bulging, no retraction, no fluid, bony landmarks normal.  Left TM normal, no bulging, no retraction, no fluid, bony landmarks normal.    NOSE: nostrils patent, no discharge, nasal mucosa pink and not inflamed.  No sinus tenderness.  THROAT: oral mucosa pink, moist and intact. No visible dental caries. Posterior pharynx without erythema or exudates.  NECK: supple, non-tender.  LUNGS: clear to auscultation bilaterally, no wheezes or rhonchi. Breathing is non labored.  CARDIO: RRR without murmur  GI: No visible scars, or masses. BS's present x4. No palpable masses or hepatosplenomegaly.  Non tender.  No guarding or  rebound tenderness  EXTREMITIES: no cyanosis, clubbing or edema.  Homans NEG.  Dorsalis Pedis 2+.  LYMPH:  No lymphadenopathy.    NEURO: A&Ox3.  CN II-XII intact.  No focal deficits.  Coordination and Gait normal.  Kernig and Brudzinski's are negative.    Rapid COVID is NEGATIVE       ASSESSMENT AND PLAN:     ASSESSMENT:  Encounter Diagnosis   Name Primary?    Upper respiratory tract infection, unspecified type Yes       PLAN:    Patient Instructions   Flonase  OTC supportive care  Albuterol if needed  Follow up with PCP   If worse seek treatment

## 2024-08-23 NOTE — PATIENT INSTRUCTIONS
Flonase  OTC supportive care  Albuterol if needed  Follow up with PCP   If worse seek treatment

## 2024-08-28 ENCOUNTER — OFFICE VISIT (OUTPATIENT)
Dept: FAMILY MEDICINE CLINIC | Facility: CLINIC | Age: 79
End: 2024-08-28
Payer: MEDICARE

## 2024-08-28 VITALS
TEMPERATURE: 99 F | RESPIRATION RATE: 18 BRPM | HEIGHT: 62 IN | HEART RATE: 62 BPM | BODY MASS INDEX: 37.54 KG/M2 | DIASTOLIC BLOOD PRESSURE: 80 MMHG | SYSTOLIC BLOOD PRESSURE: 150 MMHG | OXYGEN SATURATION: 93 % | WEIGHT: 204 LBS

## 2024-08-28 DIAGNOSIS — J01.40 ACUTE NON-RECURRENT PANSINUSITIS: Primary | ICD-10-CM

## 2024-08-28 PROCEDURE — 1160F RVW MEDS BY RX/DR IN RCRD: CPT | Performed by: NURSE PRACTITIONER

## 2024-08-28 PROCEDURE — 99213 OFFICE O/P EST LOW 20 MIN: CPT | Performed by: NURSE PRACTITIONER

## 2024-08-28 PROCEDURE — 3077F SYST BP >= 140 MM HG: CPT | Performed by: NURSE PRACTITIONER

## 2024-08-28 PROCEDURE — 1159F MED LIST DOCD IN RCRD: CPT | Performed by: NURSE PRACTITIONER

## 2024-08-28 PROCEDURE — 3008F BODY MASS INDEX DOCD: CPT | Performed by: NURSE PRACTITIONER

## 2024-08-28 PROCEDURE — 3079F DIAST BP 80-89 MM HG: CPT | Performed by: NURSE PRACTITIONER

## 2024-08-28 RX ORDER — CEFDINIR 300 MG/1
300 CAPSULE ORAL 2 TIMES DAILY
Qty: 20 CAPSULE | Refills: 0 | Status: SHIPPED | OUTPATIENT
Start: 2024-08-28 | End: 2024-09-07

## 2024-08-28 NOTE — PROGRESS NOTES
CHIEF COMPLAINT:     Chief Complaint   Patient presents with    Sinus Problem     Sore throat, L/R ear pain when swallowing s/s for 1.5 weeks.  OTC meds taken with nasal congestion and post nasal drainage.         HPI:   Shruti Orellana is a 78 year old female who presents for cold symptoms for  10  days. Symptoms have progressed into sinus congestion and been worsening since onset. Sinus congestion/pain is described as a pressure and is located mainly in sinuses and ears.  Patient also reports sore throat, congestion, cough is keeping pt up at night, ear pain, sinus pain, prior history of sinusitis. denies dental pain. Has treated symptoms with Flonase, Claritin.       Current Outpatient Medications   Medication Sig Dispense Refill    cefdinir 300 MG Oral Cap Take 1 capsule (300 mg total) by mouth 2 (two) times daily for 10 days. 20 capsule 0    albuterol (PROAIR HFA) 108 (90 Base) MCG/ACT Inhalation Aero Soln 2 puffs every 4-6 hours prn wheezing or shortness of breath 1 each 0    atorvastatin 40 MG Oral Tab Take 1 tablet (40 mg total) by mouth daily.      EPINEPHrine 0.3 MG/0.3ML Injection Solution Auto-injector Inject 0.3 mL (1 each total) into the muscle daily as needed. 1 each 0    levothyroxine (SYNTHROID) 88 MCG Oral Tab Take 1 tablet (88 mcg total) by mouth before breakfast.      aspirin 325 MG Oral Tab Take 1 tablet (325 mg total) by mouth in the morning and 1 tablet (325 mg total) before bedtime.      levothyroxine 88 MCG Oral Tab Take 1 tablet (88 mcg total) by mouth before breakfast.      metoprolol succinate ER 50 MG Oral Tablet 24 Hr Take 1 tablet (50 mg total) by mouth daily.      Omeprazole 40 MG Oral Capsule Delayed Release Take 1 capsule (40 mg total) by mouth daily. Before meal (Patient taking differently: Take 1 capsule (40 mg total) by mouth as needed. Before meal) 30 capsule 11    latanoprost 0.005 % Ophthalmic Solution Place 1 drop into both eyes nightly.        Past Medical History:     Back problem    \"disc 5 out of wack\" yrs ago    Esophageal reflux    Essential hypertension    Hyperlipidemia    Hyperthyroidism    Intraductal papilloma of left breast    surgery scheduled 6/30/22    Osteoporosis    advanced    Personal history of COVID-19    fever, body aches, loss of taste and smell- no hospitalization      Past Surgical History:   Procedure Laterality Date    Colonoscopy      Knee replacement surgery      [place] localization wire 1 site left (cpt=19281) Left 06/30/2022    intraductal papilloma    [place] localization wire 1 site right (cpt=19281)      Benign, many years ago    Other      Procedure in Mammography 6/15/22 [facility]  seed    Tonsillectomy      Upper gi endoscopy,exam        Family History   Problem Relation Age of Onset    Ovarian Cancer Maternal Aunt       Social History     Socioeconomic History    Marital status:    Tobacco Use    Smoking status: Never    Smokeless tobacco: Never   Vaping Use    Vaping status: Never Used   Substance and Sexual Activity    Alcohol use: No    Drug use: No     Social Determinants of Health      [facility] [employer] [employer]    [facility]         REVIEW OF SYSTEMS:   GENERAL:  denies  diminished appetite  SKIN: no rashes or abnormal skin lesions  HEENT: See HPI.    LUNGS: denies shortness of breath or wheezing, See HPI  CARDIOVASCULAR: denies chest pain or palpitations   GI: denies N/V/C or abdominal pain  NEURO: + sinus headaches.  No numbness or tingling in face.    EXAM:   /80   Pulse 62   Temp 98.5 °F (36.9 °C) (Oral)   Resp 18   Ht 5' 2\" (1.575 m)   Wt 204 lb (92.5 kg)   SpO2 93%   BMI 37.31 kg/m²   GENERAL: well developed, well nourished,in no apparent distress  SKIN: no rashes,no suspicious lesions  HEAD: atraumatic, normocephalic, + tenderness on palpation of sinuses  EYES: conjunctiva clear, EOM intact  EARS: TM's without erythema, no bulging, no retraction, + fluid, bony landmarks visible  NOSE: nostrils patent,  clear nasal mucous, nasal mucosa reddened and inflamed  THROAT: oral mucosa pink, moist. No visible dental caries. Posterior pharynx is not erythematous.  NECK: supple, non-tender  LUNGS: Breathing is non labored. Lungs clear to auscultation bilaterally, no wheezes or rhonchi.   CARDIO: RRR without murmur  EXTREMITIES: no cyanosis, clubbing or edema  LYMPH:  no cervical lymphadenopathy.        ASSESSMENT AND PLAN:   Shruti Orellana is a 78 year old female who presents with URI symptoms that are consistent with:      ASSESSMENT:  Encounter Diagnosis   Name Primary?    Acute non-recurrent pansinusitis Yes         PLAN: Meds as below.  Comfort care instructions as listed in Patient Instructions    Meds & Refills for this Visit:  Requested Prescriptions     Signed Prescriptions Disp Refills    cefdinir 300 MG Oral Cap 20 capsule 0     Sig: Take 1 capsule (300 mg total) by mouth 2 (two) times daily for 10 days.       Risks, benefits, side effects of medication addressed and explained.    There are no Patient Instructions on file for this visit.    The patient indicates understanding of these issues and agrees to the plan.  The patient is asked to f/u with PCP if sx's persist or worsen.

## 2025-05-04 NOTE — TELEPHONE ENCOUNTER
This [facility] RN phoned pt re left breast 1 site MRI guided biopsy recommendation by Dr. Vignesh Shin for enhancement. Procedure reviewed and all questions answered. Emotional support given. Reviewed educational handouts. On the day of the biopsy, pt instructed to take Tylenol 1000mg PO, eat a light meal & bring or wear a sports bra. Post biopsy care also reviewed with pt to include NO lifting more than 5lbs, no exercising or housework (limit upper body movement) for 24-48 hrs post biopsy Patient denies blood thinners, bleeding disorders, liver disease, and chemo. Pt verbalized understanding. Our breast center schedulers will be calling to schedule an appt that is convenient for pt. [Disease: _____________________] : Disease: [unfilled] [T: ___] : T[unfilled] [N: ___] : N[unfilled] [M: ___] : M[unfilled] [ECOG Performance Status: 1 - Restricted in physically strenuous activity but ambulatory and able to carry out work of a light or sedentary nature] : Performance Status: 1 - Restricted in physically strenuous activity but ambulatory and able to carry out work of a light or sedentary nature, e.g., light house work, office work [de-identified] : This 67-year-old male was first seen in consultation on July 20, 2016. In August of 2013 his PSA was 4.3. In September 30, 2013 it was 4.9. He underwent a biopsy in March 5, 2014 revealing Ade 7 (3+4) disease. He underwent external beam radiation therapy for a total dose of 8100 cGy in 45 fractions from May 25 and total July 31 of 2014. He subsequently experienced PSA failure and brachytherapy was considered. A CT scan was performed as part of that evaluation revealing a filling defect in the left ureter. In March 2016, he presented to the emergency room with a creatinine of 13.7 and potassium of 8.9. Bilateral nephrostomy tubes were placed. He was found to have GI bleeding and a colonoscopy revealed evidence of radiation proctitis. He has been on Lupron. 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 His initial staging was stage II, [id number].    The original pathology was reviewed prior to radiation. The left base revealed adenocarcinoma the prostate, Ade score 7 (3+4) involving 2 out of 2 cores, 40% of each core, with perineural invasion. The left mid zone revealed adenocarcinoma the prostate, Ade score 7 and (4+3) involving 50% of each of 2 cores, with perineural invasion noted. The left apex had 3 of 3 cores involved. One core was Ade 7 (3+4) involving 100% of the core. The other 2 cores had a Ade 6 (3+3) in less than 5% of 2 cores. Perineural invasion was identified as well.  Posttreatment biopsy revealed the presence of adenocarcinoma within the prostate gland. The PSA was 11 at the time of the biopsy.  Feels well at initial consultation. Started Lupron in January 2016 before the renal failure/obstruction. No pains. Fatigue at times. Appetite good, stable weight. Lost weight with illness in March, typical weight prior was 210, left hospital at 170. Weight stable. He was transfused multiple times. Some urine through penis recently. Has bilateral percutaneous nephrostomies. No blood, dysuria, no incontinence. Nocturia x 2-3 at this time. Right sided nephrostomy still has output, small amount in left bag. Due for change of tubes in September. Had radiation proctitis and has laser treatment. Sees GI in follow-up next month.   10/18/16...Had a colonoscopy, showed radiation colitis. No further rectal blood noted. Urine flow mostly from PCNs, some from penis. No dysuria, some  hematuria noted at times. Appetite is normal. No weight loss. No fatigue. Hot flushes occur daily. .  4/18/17...last scans 9/26.  Saw PCP and had PSA done, shows slight increase over manjinder. he was given ferrous sulfate to take for anemia. No pains. Urine flow is mostly thru PCNs. Still has occasional blood in the urine, noted in the bag. Has less volume form the left side.  Hot flushes occur a few a day, 15-20 minutes, occ sweats. Appetite is good, no weight loss. Some fatigue noted. No edema. Occ blood with stools, saw Dr Grossman in September, due for follow up. has not had major bleeding recently.   8/1/17...Surya states that he is feeling good, his urine flow is strong, slight increase, no pains anywhere, he gets hot flashes a "few  day, they are coming." He has some minor fatigue, He has percutaneous nephrostomies, He follows with Dr. Dick, no obvious hematuria, no breast tenderness.  11/14/17...Received Lupron from Dr Dick a few weeks ago. PSA was 0.02 on 8/1/17. He gets hot flashes a couple times a day, He has b/l nephrostomies, is able to pass urine through his penis. He denies hematuria, dysuria He states appetite is good. He gets some fatigue, He denies any pains.  4/17/18...Received Lupron from Dr Dick Jan 23, 2018. Not seen since November 17, due to illness. 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 Feels well. No pains noted. Some fatigue. Appetite is good. Reports that he has diarrheal for 2-3 months, not daily. He had loose stools this AM, took Imodium. Diarrhea occurs 1-2 days a week, the n stops. Some flatus, no cramps. No blood in the stool. Urine flow is via nephrostomy, minor amount thru penis, no dysuria, no incontinence. No blood in urine. Hot flushes most days.    Minor fatigue. Appetite normal.   7/17/18...On CAB. Feels "okay". No pains. Has bilateral nephrostomy tubes, due for change in August. No recent infections, occasionally passes urine via penis, no blood, no dysuria. Still has hot flushes, daily, more in the winter. Some fatigue, no physical impairment. Appetite is good. Weight stable. Diarrhea persists, on and off, not daily. Watery at times. takes Imodium on occasion. Occ small amount of blood with hard stools, secondary to radiation proctitis.   10/23/18...Feels "okay". No pains. Urine flow is minimal, most comes out of the nephrostomy tubes, nocturia -none. No blood, no dysuria. No incontinence. Hot flushes occur, day and night. Appetite is good, no weight. No dyspepsia, no nausea, no vomiting. Diarrheal stools occur episodically, every 2-3 weeks. No blood in the stool. Usually 1-2 BMs per day. Occasionally notes blood on toiler paper with hard stool. has RT proctitis.   2/5/19...Feels well, no pains. has hot flushes frequently, daily. Has some sweats with them at night. Appetite is good, no weight loss. Urine flow is minimal thru penis, nocturia does not occur. Most urine form PCNs, last catheter change January 2019, by IR at [facility]. Occ minor blood in the urine, when the catheter is loose. No back pains, no bone pains. Occ fatigue.   5/7/19...Had a calcium of 11.1 last visit. Called him and told to stop calcium and vit D. Repeat 3 weeks later was normal. Continues on Lupron and Casodex. Feels well. Hot flushes every day. He has some fatigue, unchanged. Appetite is good, no weight loss. Urine is minimal from penis, has bilateral PCNs. No bone pains. No edema.   8/8/19...Feels "all right", as a URTI. No pains noted. Hot flushes multiple time a day. Fatigue is present, mild. Urine flow is 'good', PCN bilateral, most flow form the right. Some flow thru penis, voids 1-2 times a day. NO blood, no dysuria. Due for PCN change next week. Appetite is good, no weight change.  10/29/19...Local failure prostate cancer, on CAB. Feels well. No pains., Urine flow is good. Has bilateral PCNs. Most of urine goes to PCNs. Occ small amount of blood in the bag. Voids 2 times a day. Hot flushes, daily, with sweats on occasion. has fatigue at times. Appetite is good, no weight loss. NO leg edema. No bleeding from the RT proctitis recently  1/28/20...Stopped bicalutamide after last visit. Last seen 3 months ago. PSA on 1/7 was 0.27, thus continued to rise somewhat. NO bone pains. Hot flushes daily, occ sweats. Appetite is good, no weight loss. No fatigue of note. Urine flow is good, but most flow is into the PCNs. Last change of PCNs was December after he had some leaking on one side. No hematuria, no dysuria. No incontinence.   5/6/20...Verbal permission granted for telephone services by patient, Surya Cervantes, on 5/6/20 at 1:35 PM.   Fells well. No pains noted. Nephrostomy tubes in place, replaced last week. Appetite is good, no weight loss. Hot flushes daily. He has chronic fatigue complaints. No leg edema noted. Urine through penis is minimal, only when tubes become clogged. No blood in the urine. No nausea, no vomiting. Has diarrhea at times, once every 2-3 weeks. No back pains noted. No fevers, no chills., No cough, no sputum.   6/17/20...Verbal permission for telephone contact was granted by the patient, Surya eCrvantes, on June 17, at 4 PM. Feels "all right". NO pains. Hot flushes, daily, occ minor sweats. Fatigue is present, rated mild, occasional naps. Appetite is good, no weight loss. No edema. Urine flow is minimal from penis, has bilateral PCNs. No blood. No incontinence. No recent infections. No cough NO CHEN. No F/C. Helps in care of his mother, watches TV, not very active. Jeana from [employer].   7/29/20...Verbal permission for telephone contact was granted by the patient, Surya Cervantes, on July 29, 2020 at 3:10 PM.  Started tawanda/pred on June 16, 2020. Feels  "pretty good". He was having diarrhea prior and he no longer had diarrhea.   No pains at this time. No edema. No headaches. Checks BP on occasion.  BPs have been "normal", he will check often. Urine flow "about the same",. Most urine comes from PCNs. No nocturia, no  hematuria, no dysuria. Appetite is normal, thinks he may have lost a few pounds. No cough, No CHEN. NO F/C. Hot flushes occur daily. No change. Mild fatigue.  8/26/20...On abiraterone and prednisone since June, PSA dropped significantly. taking meds correctly. Feels 'all right", no pains. Fatigue is present, as before, "not terrible", takes naps. Slightly more fatigue than before. Appetite is good, but lost weight. eats with his mother, she eats less and so does he. No nausea, no vomiting. No headaches, no edema of note. Most of urine comes from PCNs, Urine from penis is minimal, does not get up at night. No blood, no dysuria. No incontinence. No blood in the PCN bags.  Has leg cramps daily, particularly in the AM. Dr Angulo administers Lupron, due for Rx on 9/1/20.   9/22/20...He is seen in the office today in follow-up. He's been on abiraterone and prednisone since June. He had elevated transaminases on September 3 and his abiraterone was subsequently held. He will have repeat blood work done today after today's visit. He states that he feels "all right." His appetite is stated to be "all right." His weight has been stable. He denies dyspepsia, nausea or vomiting. He denies constipation or diarrheal stools. There is no cough or shortness of breath. He denies skin rashes or pruritus. There are no complaints of pains at this time. Fatigue is present but it does not impair his daily activities. He takes occasional naps during the day. He denies arthralgias or myalgias. There are no headaches or dizziness. He has bilateral percutaneous nephrostomy tubes draining yellow urine. There is no blood in the percutaneous nephrostomy bags. Most of the urine comes from the percutaneous nephrostomy tubes with minimal amount of urine coming out through the penis. There is no nocturia. There is no hematuria or dysuria. There is no incontinence. There is no edema in the extremities. He has occasional hot flashes but felt it has improved these past few days. He denies fevers or chills. He infrequently checks his blood pressure at home. He would get blood pressure readings ranging from 150s-160s systolic over 80s-90s diastolic. He remains independent in his activities of daily living.   10/27/20...Verbal permission for telephone services granted by the patient,  Surya Cervantes on October 27, 2020 at 3:48 PM.  Stared abiraterone in June. Feels "pretty good". NO increased edema. No headaches. Hot flushes are less. Appetite is good, no weight loss. Has PCNs, has urine form the penis at times. NO blood in the urine. No dysuria. No nocturia. No incontinence. No bone pains noted. Fatigue  RTs, takes a nap at times. Cares for his elderly mother at home.   11/25/20...Verbal permission for telephone services granted by the patient,  Surya Cervantes on November 25, 2020 at 1:50 PM On 1/2 dose tawanda due to transaminases. No pains, feels "all right". Some fatigue. Urine flow form penis in minimal, has bilateral PCNs. Occasional small amount of blood in the right PCN tube. Appetite is good, no weight loss. No cough, no CHEN. Hot flushes daily. No headaches, no edema. BP monitored at home, 151/89, pulse 95.   12/23/20...Verbal permission for telephone services granted by the patient,  Surya Cervantes on December 23, 2020 at  1:32 PM.  Feels well. No pains noted. Urine flow RTS. Has PCNs, minimal urine via penis on occasion. No blood in urine or tubes of note. Appetite is good, no weight loss. NO edema of the legs. NO headaches, no dizziness. No cough, no CHEN. No F/C. Hot flushes occur daily, less often. Due Lupron next month from Dr Angulo. Mild fatigue, take a nap. Independent in ADLs, cares for his mother. BP at home 138/90, pulse 71. Left arm 147/92  1/28/21...Verbal permission for telephone services granted by the patient,  Surya Cervantes on January 28, 2021 at 315 PM.  Surya reports that he has no pains.  There is no significant urine within his blood.  In the interim, he had to have the left percutaneous nephrostomy tube changed as there was pus present.  They had some difficulties apparently.  The tube was clogged and he was unable to change it over a guidewire.  He was not treated with any antibiotics.  He does have some urine from the penis at times.  There is no fevers or chills.  His appetite is good and there is no weight loss.  He reports that his weight is 190 pounds today.  He says his blood pressure was 146/92 in the left arm and 145/85 on the right arm.  The pulse rate was 75.  He reports no edema or headaches.  There are some hot flushes.  2/25/21...Verbal permission for telephone services granted by the patient,  Surya Cervantes on 2/25/21 at 3:10 PM Feels "all right". NO pains. He was having issues with PCNs clogged, had to have removal and reinsertion. It was the right side this week, done on Tuesday. Told to flush it daily rather than every 3 days. Appetite is good, no weight loss, no edema. No cough, no CHEN. BP is monitored, right side 139/84 today, left 134/82, pulse 79. No headaches, no significant fatigue, does nap on occasion. NO leg weakness. No N/V/D/C. Minimal urine from penis. There was more flow when the dysfunction occurred. NO blood in the urine, no dysuria.   4/7/21...  10/26/21....Last evaluation was 4/7/21, by telephone. Completed antibiotics. Was in rehab for 6 weeks. Strength is good, no pains. Appetite is good, gained some weight. urine flow is good, no incontinence, very little urine from penis, mostly from the tubes. No headaches, no dizziness, no balance issues, No falls. Hot flushes occur. Lives with mother, helps her. No edema at this time.   From neurosurgery, this summary of events....71 yo Male w/ PMHx of HTN, prostate CA (on chemo, s/p radiation), b/l nephrostomy tubes, presented  to [facility] ED on 6/14/21 with weakness and confusion. In ED foulurine in b/l nephrostomy bags noted, tachycardic, and hypotensive. s/p IVF and levo drip for septic shock, developed fluid overload with pitting edema and decreased  EF. Imaging of the spine concerning for discitis and osteomyelitis c-spine with T1 inflammation/signal changes.   Hospital coursed remarkable for BC + Klebsiella and strep anguinous. Started on vanco/zosyn 6/14. As per ID switched to ceftriaxone 2 g on 6/17. Hospitalization c/b ASHLEY on CKD with right hydronephrosis. IR consulted- no intervention, both nephrostomy tubes flushing adequately. Nephrology  consulted, started on stress dose steroids/hydrocortisone 50 mg q6 and Midodrine. CTAP showed Sludge in gallbladder.  Patient  underwent C6-7 corpectomy and posterior fusion C4-T2 on 6/29/21. Hospital course c/b tachycardia, fever with low O2 sats. Chest CT negative for  PE, lower extremity Dopplers negative. s/p PICC, discharged to inpatient rehab on 7/12/21 and then was discharged home after completion of rehab.   Last Eligard was August with Dr Angulo, 30 mg dose.   11/30/21...on tawanda/pred since June 2020. Feels  "all right". Has hot flushes and fatigue. Does not prevent activities. He falls asleep easily if he sits in a chair. Independent in ADLs, cares for his mother. No infections recently, No F/C. No recent adventures with with his PCNs. No blood in urine, occ urine via penis. Appetite is good, gained 2.2 kilos. Edema decreased. No chest pain/pressure. No cough/ CHEN. No headaches, no dizziness. No N/V/D/C.   1/11/22....telehealth today. Feels  "all right". No pains of note. t flushes daily. Has some fatigue as well. No edema noted. NO cough/CHEN No chest pain/pressure. No F/C. Appetite is good, no weight loss. Weighs 176. Checks BP at home, 142/98 RP 72 today, L arm, 142/91. Takes terazosin at night. Sees PCP in March. Needs to call PCP for tighter BP control. NO headaches, no dizziness. No F/C. No hematuria, has bilateral PCNS, has some small amount of urine from the penis. Had change of PCNs last week. Independent in ADLs.   2/8/22 - telehealth visit today. Continues on abiraterone 500mg daily + prednisone. Reports his BP today was 143/94 with HR of 74. Overall feeling "alright". Moderate fatigue, takes naps during the day. Occasional hot flashes. Appetite is good, weight today is 180lbs, he attributes recent weight gain to eating better after being discharged from the hospital. Mild ptosis of left eyelid comes and goes and has reportedly been stable for his "whole life". Bilat percutaneous nephrostomy tubes in place, no hematuria. Ongoing mild BLE edema is reportedly stable. Ongoing intermittent arthritis pains of left knee and right shoulder are stable. Denies fever, chills, headache, dizziness, balance issues, eye pain/problems, mucositis/odynophagia, chest pain, palpitations, SOB, cough, nausea/vomiting, diarrhea/constipation, abdominal pain, hematuria, rash/pruritus, neuropathy, bleeding, weakness, anxiety/depression.  3/17/22...tawanda/pred started mid June 2020. PSA was 11.6 at start. Feels "all right". No pains. No major issues with the PCNs, having a change done next week. Appetite is good, no weight loss. No cough, No CHEN. Some leg edema. No chest pain/pressure. Urine from penis is relatively little. No dysuria, no blood. has calcium oxalate excretion, which causes his tubes to be clogged. Notes some bruising. NO headaches, no dizziness, no balance issues. No F/C. No N/V/C, some minor diarrheal stools at times. Takes Imodium as as needed. Fatigue is present at times, has hot flushes occur multiple times a day.   4/20/22 - continues on abiraterone (500mg daily) + prednisone since June 2020 for mCRPC. Overall feeling "alright", ongoing mild fatigue is stable. Intermittent hot flashes are tolerable. Mild night sweats during the summer when it gets warmer. Ongoing mildly productive cough which he's had for "a long time". Occasional diarrhea that doesn't last more than a day. Bilateral percutaneous nephrostomy tubes in place, passing minimal urine from his penis. Occasional hematuria especially from right nephrostomy bag, clear today. Ongoing mild BLE edema. Ongoing chronic left knee swelling and pain 2/2 arthritis. Ongoing right shoulder pain 2/2 arthritis as well. Denies fever, chills, headache, dizziness, balance issues, eye pain/problems, mucositis/odynophagia, chest pain, palpitations, SOB, nausea/vomiting, constipation, abdominal pain, rash/pruritus, bleeding, muscle pain/weakness  5/24/22 - continues on abiraterone (500mg daily) + prednisone since June 2020 for mCRPC. BP today is 164/95, asymptomatic. BP at home has been 150s/80-90s. On occasion the DBP has been >100. Overall feeling "alright", ongoing fatigue is stable. Ongoing intermittent hot flashes. Appetite is good. Vision is changing in his left eye, he has an appt with [facility] this Thurs. Bilateral nephrostomy tubes in place, occasional hematuria at times resolves after 2-3 days. Ongoing trace edema of BLEs. Intermittent pruritus around nephrostomy tube dressings. Denies fever, chills, night sweats, headache, dizziness, balance issues, mucositis/odynophagia, chest pain, palpitations, SOB, cough, nausea/vomiting, diarrhea/constipation, abdominal pain, dysuria, incontinence,  rash, neuropathy, bleeding, muscle or joint pain/weakness.   6/21/22 - continues on abiraterone, half dose + prednisone since June 2020 for mCRPC. PSA 11.6 at start of treatment in June 2020, manjinder PSA at o.o5 August 2021, slow rise, recent PSA in May 2022 was 0.17.  feels  "all right". No pains noted. Has bilateral PCNs, changed last week. No infections, no F/C. Some fatigue, always, no worse. Appetite is good, no weight loss. NO headaches, no dizziness, no balance issues. Some edema noted, as before. No chest pain/pressure. NO bone pains. No N/V/D/C. Small amounts of urine via the penis. Hot flushes daily, multiple times. No cough, no CHEN.   7/28/22 - continues on abiraterone 500mg + prednisone since June 2020 for mCRPC. Overall feeling "alright", ongoing mild fatigue for which he occasionally takes a nap once a day. Ongoing hot flashes are tolerable. Appetite is good. Occasional cough. Bilateral percutaneous nephrostomy tubes in place passing most of the urine through the bags but still passing minimal urine via penis, no nocturia. Trace edema of BLEs. Notes intermittent cramping of hands which may be arthritis as it improves with movement. Has ongoing intermittent right buttock pain radiating down to the knee, present for the past year, pain is worse with sitting for a long time, improves with changing positions, max pain is 3/10 not requiring medication. Denies fever, chills, night sweats, headache, dizziness, balance issues, eye pain/problems, mucositis/odynophagia, chest pain, palpitations, SOB, nausea/vomiting, diarrhea/constipation, abdominal pain, dysuria, hematuria, incontinence, rash/pruritus, bleeding, weakness.   10/11/22 - continues on abiraterone 500mg daily + prednisone since June 2020 for mCRPC. Neurosurgical notes reviewed...."S/P cervical spinal fusion, 71 year old male 15 months pos op C6-7 corpectomy and posterior fusion C4-T2 on 6/29/21 with Dr. Tiffanie Martinez". Now seeing Dr Resendiz. Overall, feels "all right". No pains noted. urine flow is "fine", no incontinence, most urine still out PCNs, some from the penis. No blood, no dysuria. Hot flushes daily.  Some fatigue, RTS. Appetite is good, no weight loss. No headaches, dizziness, no balance issues, No paresthesias. Occ cough, sputum is white. No CHEN. Tubes to be changed next week. No N/V/D/C.  Some edema , improved. No chest pain/pressure  11/9/22 - continues on abiraterone 500mg daily + prednisone since June 2020 for mCRPC which he is tolerating well. Pt reports feeling generally well. No new complaints. Bilateral nephrostomy tubes in place, denies hematuria . Ongoing trace edema of BLEs. Intermittent pruritus around nephrostomy tube dressings. Denies fever, chills, night sweats, headache, dizziness, balance issues, mucositis/odynophagia, chest pain, palpitations, SOB, cough, nausea/vomiting, diarrhea/constipation, abdominal pain, dysuria, incontinence, rash, neuropathy, bleeding, muscle or joint pain/weakness. Appetite reported as good. No changes in medications  12/7/22 - continues on abiraterone 500mg daily + prednisone since June 2020 for mCRPC.  gets his Eligard 45 tomorrow form Dr Angulo. feels "all right". No pains noted except for arthrtic complaints. Appetite is okay< dropped a few pounds. usual edema, no change. No headaches, NO dizziness. Most of the urine comes from PCNs, changed every 6 weeks due to prior infections. NO fevers, no chills. Hot flushes occur, 1-2 times a day. Notes a bit more urine thru penis lately. Occ cough, no CHEN. No chest pain/pressure/palpitations. NO N/V/D/C. Mild fatigue. Independent in ADLs.   1/4/23 - continues on abiraterone 500mg daily + prednisone since June 2020 for mCRPC. Overall feeling "alright", ongoing fatigue is stable. Ongoing hot flashes are tolerable. Appetite is "ok". Ongoing occasional productive cough is stable. Bilateral perc nephrostomy tubes in place, continues to pass some urine through the penis, no nocturia. Denies fever, chills, night sweats,headache, dizziness, balance issues, eye pain/problems, mucositis/odynophagia, chest pain, palpitations, SOB, nausea/vomiting, diarrhea/constipation, abdominal pain, dysuria, hematuria, incontinence, LE edema, rash/pruritus, bleeding, muscle or joint pain/weakness.   2/13/23..... continues on abiraterone 500mg daily + prednisone since June 2020 for mCRPC. treated with cephalexin and developed rash, red skin, pruritus. Chart labeled as allergy. Hospitalization was brief. sudden onset of symptoms, Hd diarrheal stools as well. Feels  "pretty good", except for residual pruritus. No pains noted.  Has tube change set for next week. Appetite has retuned. No N/V. Diarrhea resolved, NO taste alteration. No headaches, no dizziness. had some edema. resolved. No chest pain/pressure/palpations.  has his usual fatigue and hot flushes. Cough, chronic, with clear sputum. No CHEN/SOB. No fevers of chills since discharge  Hospital Course:  Discharge Date	01-Feb-2023  Admission Date	29-Jan-2023 17:52  Reason for Admission	Syncope  Hospital Course	  72 yo M w/ HTN, gout, prostate cancer s/p b/l nephrostomy tubes, on abiraterone, admitted w/ sepsis 2/2 UTI, improved on antibiotics.  Sepsis secondary to UTI.  was febrile, tachycardic in the ED, met criteria for sepsis on admission  - U/A w/ +nitrite, large LE, prior cultures with klebsiella and E. coli  - current Ucx >3 organisms suggestive of collection contamination  - received IV zosyn (1/29/23-2/1/23),  Bcx negative, discharge on oral cephalexin to complete 10 days for complicated UTI (end date 2/7/23)  - sepsis resolved (currently afebrile, without leukocytosis, tachycardia resolved)  - IR consulted nephrostomy tubes in position and draining well, no indication for exchange at this time.   Gastroenteritis.   had reported nausea/vomiting/diarrhea after eating fried rice w/ beef  - s/p IV fluids, now resolved.   Syncope.   reports syncopal episode likely 2/2 dehydration from gastroenteritis  - s/p IV fluids, EKG sinus rhythm, troponin indeterminate but no increased  - reports symptoms resolved, ambulating around unit independently (advised call don't fall).   3/21/23..... continues on abiraterone 500mg daily + prednisone since June 2020 for mCRPC. treated with cephalexin and developed rash, red skin, pruritus. Chart labeled as allergy. Overall, he feels "all right". No pains of note. Appetite is good, no weight loss. No edema. No chest pain/pressure/palpitations. some cough, no CHEN. Hot flushes daily. Fatigue is present.  Does yoga, runs in place. No HA, no dizziness, no falls. Independent in ADLs. no fevers, no chills. Minimal urine from penis, no dysuria. PCNs are changed every 6 weeks, no blood in tubes. No N/V/D/C.   4/17/23.....  on abiraterone 500mg and prednisone since June 2020 for mCRPC. Prior transaminitis led to decrease in dose. "doing okay". Minor discomfort on left side near the PCN. Has change every 6 weeks at this time. Appetite is good, no weight loss. No N/V/D/C. Has some urine form the penis, noted if PCNs pinch. No blood in the urine, no hematuria. Hot flushes as before, about 3 times a day.  fatigue RTS. No cough, no CHEN, No fevers, no chills. No edema, no chest pains,pressure/palpitations  Exercises every AM.   5/16/23....continues on abiraterone 500mg and prednisone since June 2020 for mCRPC. Prior transaminitis led to decrease in dose. Eligard next month with Dr Angulo. Overall, feels "all right". No pains noted. Appetite is good, no weight loss. No N/V/D/C. Urine from penis on occasion, empties bladder before bed. No hematuria, no dysuria. No fevers, no chills. PCNs were changed last week. No headaches, no dizziness, no balance issues.  Exercises daily, walking and running. Occ cough, no CHEN. No chest pain/pressure/palpitations. Hot flushes remains. No fevers, no chills  6/12/23.... on abiraterone 500mg and prednisone since June 2020 for mCRPC. Prior transaminitis led to decrease in dose. Mara with Dr Angulo. Feels "well". NO pains, except for radicular pain in right thigh, present for about 6 weeks, constant. uses a topical, icy-hot. Does not awaken him, better when he is lying down, aggravated by ambulation. Appetite is normal. No weight loss. NO fevers, no chills. NO fatigue. No headaches, no dizziness, no paresthesias. No cane. No falls. No cough, no CHEN. NO N/V/D/C. Urine from PCNs. Some from the penis, no hematuria, no dysuria. No nocturia. Hot flushes daily. No edema. NO chest pain/pressure/palpitations. No back pains. Independent in ADLs  7/18/23 - continues on abiraterone + prednisone (500mg daily d/t transaminitis) for mCRPC since June 2020. Ongoing fatigue after periods activity, naps once a day. Ongoing hot flashes are tolerable. Appetite is good. Occasional cough. Bilateral perc nephrostomy tubes in place, scant blood if the tubing moves around but not persistent. Ongoing pain in right buttock radiating down posterior thigh/leg, no pain with lying down, pain is worse with sitting for a prolonged time and improves with walking, max pain is 8/10. Not taking any pain medication as the pain eases up when he gets up and walks around.   8/16/23 - continues on abiraterone + prednisone (500mg daily d/t transaminitis) for mCRPC since June 2020. Overall feeling "alright", ongoing fatigue is stable. Ongoing frequent hot flashes, tolerable. Appetite is "alright". Ongoing occasional cough. Perc nephrostomy tubes exchanged 2wks ago, occasional transient hematuria which he attributes to accidentally pulling on the tubes. Ongoing stable intermittent right buttock pain radiating down posterior thigh down to the calf, improves with movement and worse with sitting for a long time, max pain is 5/10. Using a topical cream called "Australian Dream" which has been helpful.   9/20/23 - continues on abiraterone + prednisone (500mg daily d/t transaminitis) for mCRPC since June 2020. last PCN change was last week. On a 6 week change protocol. Feels "not bad", chronic leg pains, form right buttock, down the leg. Passing urine via penis, very small amounts. No blood, no dysuria. No nocturia. No incontinence, voids about 2 times a day. Hot flushes, every day, no sweats. Fatigue is present, naps, feels refreshed afterwards. Does yoga, stretching, walking, lifts some weights, daily basis. Appetite is good, no weight Kenji Occ cough, no CHEN. No edema, no chest pain/pressure.   10/31/23 - continues on abiraterone + prednisone (500mg daily d/t transaminitis) for mCRPC since June 2020. Overall feeling "alright", ongoing fatigue is stable. Occasional hot flashes. Appetite is "alright". Perc nephrostomy tubes in place, passing minimal urine through his penis. Ongoing pain in right buttock that radiates down the posterior leg, worse in the morning when first waking up and worse with sitting for a prolonged period, improves/resolves with activity. Denies fever, chills, night sweats, headache, dizziness, balance issues, chest pain, palpitations, SOB, cough, nausea/vomiting, diarrhea/constipation, abdominal pain, dysuria, hematuria, incontinence, LE edema, bleeding.   12/5/23 - continues on abiraterone + prednisone (500mg daily d/t transaminitis) for mCRPC since June 2020. Overall feeling well, some fatigue but remains active and exercising regularly. Ongoing hot flashes are tolerable. Appetite is good. Bilateral percutaneous nephrostomy tubes in place, no hematuria. Ongoing right buttock pain that radiates down the posterior leg, worse in the morning and improves with activity, max pain is 4-5/10, declines referral to PM&R. Denies fever, chills, night sweats, headache, dizziness, balance issues, chest pain, palpitations, SOB, cough, nausea/vomiting, diarrhea/constipation, abdominal pain, LE edema, bleeding.  1/2/24 - on abiraterone + prednisone since June 2020, discontinued 12/26/23 for POD seen on 12/21/23 bone scan. Feeling well, no significant fatigue. Remains active and doing exercises daily. Occasional hot flashes are tolerable. Bilateral percutaneous nephrostomy tubes in place, no hematuria. Ongoing right buttock pain that radiates down the posterior leg, worse in the morning and improves with activity, max pain is 7-8/10, pain is stable. Denies fever, chills, night sweats, headache, dizziness, balance issues, chest pain, palpitations, SOB, cough, nausea/vomiting, diarrhea/constipation, abdominal pain, LE edema, bleeding.   2/14/24 - Xtandi started early Jan 2024 for mCRPC. Over the past week he's been very fatigued. Ongoing hot flashes. Appetite is decreased and not eating as much as he used to, weight is down 11lbs over the past 6wks. Stable cough since before start of Xtandi. Percutaneous nephrostomy tubes in place, no hematuria. Notes some pains in the hands, knees, and ankles which he attributes to arthritis. Ongoing pain in right buttock radiating down posterior leg, pain is worse in the AM when first waking up and improves with activity/walking, pain waxes and wanes, max pain is 7/10 but not taking any medication for this. Denies fever, chills, night sweats, headache, dizziness, balance issues, chest pain, palpitations, SOB, nausea/vomiting, diarrhea/constipation, abdominal pain, LE edema, bleeding.   3/14/24 - continues on Xtandi since early Jan 2024 for mCRPC. Overall feeling ok, fatigue is a little improved which he believes is because his BP hasn't been as low as it was in the past. BP at home has been 120's/70-80's in the mornings. Ongoing hot flashes are not as intense. Appetite is decreased, weight is down 7lbs over the past month, but overall down 18lbs over 2 months. Urine from bilateral percutaneous nephrostomy tubes is "good", no hematuria. Ongoing pain in right buttock down the posterior leg, pain is worse with sitting for a prolonged period, improves with getting up and moving around, not interfering with his sleep, not needing any medication for pain. Denies fever, chills, night sweats, headache, dizziness, balance issues, chest pain, palpitations, SOB, cough, nausea/vomiting, diarrhea/constipation, abdominal pain, LE edema, bleeding.  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 NO AAW benefit. Started tawanda/pred mid June 2020, discontinued 12/26/23 for disease progression. Xtandi started early Jan 2024, decreased to 80mg daily in May 2024 d/t weight loss. Switched to docetazel in 12/2024 due to PSA progression. [de-identified] : primary RT, with failure locally\par  \par  march 2014 diagnosis, RT followed\par  recurrence in left ureter January 2016, started Lupron [de-identified] : 4/16/24 - continues on Xtandi since early Jan 2024 for mCRPC. Excess lambda light chains on last blood work, no M-spike. Feels well. no Pains. Has to some fatigue, naps during the day. Appetite is "good", No N/V/D/C. No cough, no CHEN. NO chest pain/pressure. No headaches, no dizziness. No balance issues, minimal, walks with a cane. No falls. Urine  flos via tubes, no recent infections. NO blood in urine. Some minor urine amounts from penis. No nocturia, no dysuria. Hot flushes occur, less than before, 1-2 times a day. No edema noted. Independent in ADLs.  CDX done, no targets  5/15/24 - continues on Xtandi since early Jan 2024 for mCRPC. Feeling "alright". Ongoing fatigue, naps 1-2x/day. Occasional hot flashes. Decreased appetite, not eating as much as before, drinking 1-2 Ensure per day, weight is down 5lbs over the past month. Bilateral percutaneous nephrostomy tubes in place, no hematuria. Ongoing arthritic pains of the shoulders and hands at times. Denies fever, chills, night sweats, headache, dizziness, balance issues, chest pain, palpitations, SOB, cough, nausea/vomiting, diarrhea/constipation, abdominal pain, LE edema, bleeding.   6/12/24 - continues on Xtandi since early Jan 2024 for mCRPC, decreased to 80mg daily as of May 2024 for weight loss. Ongoing fatigue is stable. Ongoing hot flashes. Appetite is "a little better". Urine from nephrostomy tubes is clear yellow, no hematuria. Denies fever, chills, night sweats, headache, dizziness, balance issues, chest pain, palpitations, SOB, cough, nausea/vomiting, diarrhea/constipation, abdominal pain, LE edema, bleeding, muscle or joint pain/weakness.  7/11/24 - on Xtandi since early Jan 2024 for mCRPC, decreased to 80mg daily as of May 2024 for weight loss. Patient accompanied by brother, reports to be feeling well, appetite slowly improving, continues to have nephrostomy tubes in places, last changed 23 weeks ago draining clear yellow urine. Denies fever, chills, headaches, chest pain, sob, abdominal pain/back pain.   8/14/24 - on Xtandi since early Jan 2024 for mCRPC, decreased to 80mg daily as of May 2024 for weight loss. Overall feeling well, ongoing fatigue is stable. Ongoing hot flashes.  Appetite is stable, weight is up a couple lbs since last visit. Diarrhea yesterday "all day" with >6-7 episodes of soft/loose stool, he took PRN Imodium yesterday, no more stool today, unsure if it was r/t something he ate. Urine from nephrostomy tubes is "good", no hematuria. Denies fever, chills, night sweats, headache, dizziness, chest pain, palpitations, SOB, cough, nausea/vomiting, constipation, abdominal pain, LE edema, bleeding, muscle or joint pain/weakness.  9/17/24 - on Xtandi since early Jan 2024 for mCRPC, decreased to 80mg daily as of May 2024 for weight loss. Feeling "alright", stable fatigue. Ongoing hot flashes are tolerable. Appetite is stable. Having intermittent diarrhea approx every other week with approx 3 episodes when it occurs but resolves with PRN Imodium 1-2 tabs, having normal BMs in between, unsure if diarrhea is r/t eating spinach. Bilateral nephrostomy tubes in place. Denies fever, chills, night sweats, headache, dizziness, chest pain, palpitations, SOB, cough, nausea/vomiting, constipation, abdominal pain, hematuria, LE edema, bleeding, muscle or joint pain/weakness.  10/16/24 - on Xtandi since early Jan 2024 for mCRPC, decreased to 80mg daily as of May 2024 for weight loss. Ongoing fatigue is stable. Ongoing hot flashes are tolerable. Appetite is "alright". Occasional diarrhea is improved, only 2 episodes of diarrhea over the past month. Bilateral percutaneous nephrostomy tubes in place, still passes some urine through his penis. Ongoing intermittent arthritic pains of the shoulders and knees are stable since before cancer diagnosis. Denies fever, chills, night sweats, headache, dizziness, chest pain, palpitations, SOB, cough, nausea/vomiting, constipation, abdominal pain, dysuria, hematuria, incontinence, LE edema, bleeding.   11/14/24: presents for follow up and management of metastatic CRPC on ADT + reduced dose enzalutamide in setting of weight loss and fatigue. His PSA has been noted to be rising over the last several visits from a manjinder of 8.13 to 15.3 at last visit on 10/16/24.   12/2/24: He presents for follow up and cycle 1 of docetaxel 75mg/m2 after his PSA was noted to rise further from 15.3 to 21.4. He has stopped enzalutamide.  He denies any new significant complaints since last visit.   12/23/24: He presents for follow up and cycle 2 of docetaxel 75mg/m2. He notes transient diarrhea and increased fatigue after cycle 1. Also notes alopecia and increased blood pressure when he takes steroid premedication. Denies fever, chills, night sweats, bony pain.  1/13/25:  He presents for follow up and cycle 3 of docetaxel 75mg/m2. He denies any significant change in symptoms. He has intermittent leaking from nephrostomy sites, increased on days of tx.  Notes continued fatigue. Also notes alopecia and increased blood pressure when he takes steroid premedication. Denies fever, chills, night sweats, bony pain.  2/3/25:  He presents for follow up and cycle 4 of docetaxel. He denies any significant change in symptoms.  Notes continued fatigue. Also notes alopecia and increased blood pressure when he takes steroid premedication. Denies fever, chills, night sweats, bony pain.   2/27/25: He presents for follow up and cycle 5 of docetaxel...now dose reduced to 60mg/m2 2/2 cytopenias and fatigue.  He denies any significant change in symptoms.  Notes continued fatigue. BP has been stable. Denies fever, chills, night sweats, bony pain. Last imaging in October. Will repeat after C6. PSA continues to trend downward.   3/20/25: He presents for follow-up and cycle 6 of docetaxel. Does not appreciate a symptomatic difference between the prior dose and his reduced dose he received last cycle. Endorses hot flashes. Denies pain. Nephrostomy tubes functional. Reports occasional blood from R tube. CBC with improvement in Hb since dose reduction. Has been losing weight although endorses good appetite and is "always thirsty".   4/10/25: He presents for follow-up and cycle 7 of docetaxel. Pt dose reduced to 60mg/m2 since C4 2/2 fatigue and cytopenias. Does not appreciate a symptomatic difference between the prior dose and his reduced dose he received last cycle. Endorses occasional hot flashes. Denies pain. Nephrostomy tubes functional. Less leakage since tube exchange. No bleeding. He continues on monthly xgeva. Denies jaw pain or issue with teeth. Pt noted with elevated /84. Pt reports that he did not take terazosin today because his BP was much lower at home. He states that "he only takes his medication when his morning BP is high". Pt asymptomatic. Pt weight noted to be trending downward. Lost 7 lbs since beginning of the year. Appetite reported as good. Weight loss not intentional.   5/1/25: Patient presents for a follow-up. Cycle 8 of docetaxel today. Reports interval development of sporadic diarrhea which he manages with imodum. Also reporting gas and urgency. Reports some episodes of higher volume of urine.  Mild fatigue, no fevers, chills, change in appearance of urine.

## 2025-08-30 ENCOUNTER — HOSPITAL ENCOUNTER (EMERGENCY)
Age: 80
Discharge: HOME | End: 2025-08-30
Attending: EMERGENCY MEDICINE

## 2025-08-30 VITALS
OXYGEN SATURATION: 94 % | RESPIRATION RATE: 18 BRPM | SYSTOLIC BLOOD PRESSURE: 145 MMHG | BODY MASS INDEX: 36.5 KG/M2 | WEIGHT: 206 LBS | TEMPERATURE: 98 F | HEART RATE: 65 BPM | HEIGHT: 63 IN | DIASTOLIC BLOOD PRESSURE: 66 MMHG

## 2025-08-30 DIAGNOSIS — L23.7 ALLERGIC CONTACT DERMATITIS DUE TO URUSHIOL FROM POISON SUMAC: Primary | ICD-10-CM

## 2025-08-30 DIAGNOSIS — T14.8XXA MUSCLE STRAIN: ICD-10-CM

## 2025-08-30 PROCEDURE — 99283 EMERGENCY DEPT VISIT LOW MDM: CPT

## 2025-08-30 RX ORDER — DEXAMETHASONE 4 MG/1
10 TABLET ORAL ONCE
Status: COMPLETED | OUTPATIENT
Start: 2025-08-30 | End: 2025-08-30

## 2025-08-30 RX ORDER — CYCLOBENZAPRINE HCL 10 MG
10 TABLET ORAL 3 TIMES DAILY PRN
Qty: 20 TABLET | Refills: 0 | Status: SHIPPED | OUTPATIENT
Start: 2025-08-30 | End: 2025-09-06

## 2025-08-30 RX ORDER — PREDNISONE 20 MG/1
40 TABLET ORAL DAILY
Qty: 10 TABLET | Refills: 0 | Status: SHIPPED | OUTPATIENT
Start: 2025-08-30 | End: 2025-09-04

## (undated) DIAGNOSIS — D36.9 INTRADUCTAL PAPILLOMA: Primary | ICD-10-CM

## (undated) DIAGNOSIS — R92.8 ABNORMAL MRI, BREAST: Primary | ICD-10-CM

## (undated) DIAGNOSIS — N64.52 DISCHARGE FROM LEFT NIPPLE: ICD-10-CM

## (undated) DIAGNOSIS — N64.52 DISCHARGE FROM LEFT NIPPLE: Primary | ICD-10-CM

## (undated) DEVICE — SUT MONOCRYL 4-0 PS-2 Y496G

## (undated) DEVICE — DRAPE PACK CHEST & U BAR

## (undated) DEVICE — HEMOCLIP HORIZON SM 001200

## (undated) DEVICE — BRA SURGICAL ELIZABETH PINK XL

## (undated) DEVICE — GOWN,SIRUS,FABRIC-REINFORCED,LARGE: Brand: MEDLINE

## (undated) DEVICE — BREAST-HERNIA-PORT CDS-LF: Brand: MEDLINE INDUSTRIES, INC.

## (undated) DEVICE — 3M™ STERI-DRAPE™ INSTRUMENT POUCH 1018: Brand: STERI-DRAPE™

## (undated) DEVICE — SUT SILK 2-0 FS 685G

## (undated) DEVICE — SLEEVE KENDALL SCD EXPRESS MED

## (undated) DEVICE — SUT VICRYL 3-0 SH J416H

## (undated) DEVICE — LIGHT HANDLE

## (undated) DEVICE — ABDOMINAL PAD: Brand: DERMACEA

## (undated) DEVICE — SUT CHROMIC GUT 3-0 SH G122H

## (undated) DEVICE — UNDERPAD 23X36 LIGHT CHUX

## (undated) DEVICE — SOLUTION  .9 1000ML BTL

## (undated) DEVICE — HEMOCLIP HORIZON MED 002200

## (undated) DEVICE — MEGADYNE E-Z CLEAN BLADE 2.75"

## (undated) DEVICE — DRAPE,TAPE STRIPS,STERILE: Brand: MEDLINE

## (undated) DEVICE — STERILE POLYISOPRENE POWDER-FREE SURGICAL GLOVES: Brand: PROTEXIS

## (undated) DEVICE — TOWEL SURG OR 17X30IN BLUE

## (undated) NOTE — LETTER
Patient Name: Latasha Eastman        : 1945       Medical Record #: [id number]    CONSENT FOR PROCEDURES/SEDATION    Date: 22      Time: 0700 AM        1. I authorize the performance upon Latasha Mazariegos the following:                               Image guided left breast Biopsy with one clip x 1 site    2. I authorize Dr. Vignesh Shin to perform the above mentioned procedures. 3. If any unforeseen conditions arise during this procedure calling for additional procedures, operations, or medications (including anesthesia and blood transfusion), I  further request and authorize the doctor to do whatever he/she deems advisable in my interest.    4. I consent to the taking and reproduction of any photographs in the course of this procedure for professional purposes. 5. I consent to the administration of such sedation as may be considered necessary or advisable by the physician responsible for this service, with the exception of  _____________________________. 6. I have been informed by my doctor of the nature and purpose of this procedure/sedation, possible alternative methods of treatment, risk involved and possible complications.       Signature of Patient:  ___________________________    Signature of person authorized to consent for patient: Relationship to patient:  ___________________________    ___________________    Witness: ____________________     Date: ______________

## (undated) NOTE — MR AVS SNAPSHOT
[facility]  [address] [id number] [phone]               Thank you for choosing us for your health care visit with EMELYN Chiu. We are glad to serve you and happy to provide you with this summary of your visit.   Please h GO to the ER if any facial or periorbital swelling develops     The patient indicates understanding of treatment plan and agrees to plan.     · If you develop a rash, hives, itching, throat tightness, or shortness of breath while on the antibiotic or shortl phenylephrine or oxymetazoline. First blow the nose gently. Then use the spray. Do not use these medicines more often than directed on the label or symptoms may get worse. You may also use tablets containing pseudoephedrine.  Avoid products that combine ing Allergies as of Jan 25, 2017     Sulfa Antibiotics Other (See Comments)    Upset Stomach                Today's Vital Signs     BP Pulse Temp Height Weight BMI    122/80 mmHg 80 97.8 °F (36.6 °C) (Oral) 62\" 180 lb 32.91 kg/m2    Breastfeeding? Support Staff. Remember, MyChart is NOT to be used for urgent needs. For medical emergencies, dial 911. Educational Information     Your blood pressure indicates you may be at-risk for Hypertension.    Please consider the following Lifestyle Modific active are less likely to develop some chronic diseases than adults who are inactive.      HOW TO GET STARTED: HOW TO STAY MOTIVATED:   Start activities slowly and build up over time Do what you like   Get your heart pumping – brisk walking, biking, swimmin

## (undated) NOTE — ED AVS SNAPSHOT
Carlo Graf   MRN: [id number]    Department:  [address] [facility] in [place]   Date of Visit:  2/19/2018           Disclosure     Insurance plans vary and the physician(s) referred by the ER may not be covered by your plan.  Please contac tell this physician (or your personal doctor if your instructions are to return to your personal doctor) about any new or lasting problems. The primary care or specialist physician will see patients referred from the [facility] Emergency Department.  Severo Pastures

## (undated) NOTE — MR AVS SNAPSHOT
[facility]  [address] [id number] [phone]               Thank you for choosing us for your health care visit with EMELYN Adams. We are glad to serve you and happy to provide you with this summary of your visit.   Please hel spreading, wash your hands often. Use a separate tissue to wipe each eye. Don’t touch your eyes or share bedding or towels. Date Last Reviewed: 6/11/2015  © 4788-7877 [address], [address] [place] [address].  All rights reserve [facility] will allow you to access patient instructions from your recent visit,  view other health information, and more. To sign up or find more information, go to [email or web address] and click on the Sign Up Now link in the [employer] box.      Enter